# Patient Record
Sex: FEMALE | Race: WHITE | NOT HISPANIC OR LATINO | URBAN - METROPOLITAN AREA
[De-identification: names, ages, dates, MRNs, and addresses within clinical notes are randomized per-mention and may not be internally consistent; named-entity substitution may affect disease eponyms.]

---

## 2019-11-04 ENCOUNTER — OUTPATIENT (OUTPATIENT)
Dept: OUTPATIENT SERVICES | Facility: HOSPITAL | Age: 74
LOS: 1 days | End: 2019-11-04
Payer: MEDICARE

## 2019-11-04 DIAGNOSIS — R04.9 HEMORRHAGE FROM RESPIRATORY PASSAGES, UNSPECIFIED: ICD-10-CM

## 2019-11-04 PROCEDURE — 70498 CT ANGIOGRAPHY NECK: CPT

## 2019-11-04 PROCEDURE — 70498 CT ANGIOGRAPHY NECK: CPT | Mod: 26

## 2020-09-11 ENCOUNTER — EMERGENCY (EMERGENCY)
Facility: HOSPITAL | Age: 75
LOS: 1 days | Discharge: HOME | End: 2020-09-11
Attending: EMERGENCY MEDICINE
Payer: MEDICARE

## 2020-09-11 VITALS
RESPIRATION RATE: 18 BRPM | WEIGHT: 149.03 LBS | SYSTOLIC BLOOD PRESSURE: 151 MMHG | OXYGEN SATURATION: 95 % | HEART RATE: 100 BPM | DIASTOLIC BLOOD PRESSURE: 73 MMHG | HEIGHT: 68 IN | TEMPERATURE: 98 F

## 2020-09-11 VITALS
HEART RATE: 79 BPM | SYSTOLIC BLOOD PRESSURE: 153 MMHG | OXYGEN SATURATION: 98 % | DIASTOLIC BLOOD PRESSURE: 80 MMHG | RESPIRATION RATE: 15 BRPM

## 2020-09-11 LAB
ALBUMIN SERPL ELPH-MCNC: 4.5 G/DL — SIGNIFICANT CHANGE UP (ref 3.3–5)
ALP SERPL-CCNC: 111 U/L — SIGNIFICANT CHANGE UP (ref 40–120)
ALT FLD-CCNC: 15 U/L — SIGNIFICANT CHANGE UP (ref 10–45)
ANION GAP SERPL CALC-SCNC: 10 MMOL/L — SIGNIFICANT CHANGE UP (ref 5–17)
APPEARANCE UR: CLEAR — SIGNIFICANT CHANGE UP
AST SERPL-CCNC: 19 U/L — SIGNIFICANT CHANGE UP (ref 10–40)
BASOPHILS # BLD AUTO: 0.06 K/UL — SIGNIFICANT CHANGE UP (ref 0–0.2)
BASOPHILS NFR BLD AUTO: 0.7 % — SIGNIFICANT CHANGE UP (ref 0–2)
BILIRUB SERPL-MCNC: 0.2 MG/DL — SIGNIFICANT CHANGE UP (ref 0.2–1.2)
BILIRUB UR-MCNC: NEGATIVE — SIGNIFICANT CHANGE UP
BUN SERPL-MCNC: 19 MG/DL — SIGNIFICANT CHANGE UP (ref 7–23)
CALCIUM SERPL-MCNC: 10 MG/DL — SIGNIFICANT CHANGE UP (ref 8.4–10.5)
CHLORIDE SERPL-SCNC: 98 MMOL/L — SIGNIFICANT CHANGE UP (ref 96–108)
CO2 SERPL-SCNC: 25 MMOL/L — SIGNIFICANT CHANGE UP (ref 22–31)
COLOR SPEC: COLORLESS — SIGNIFICANT CHANGE UP
CREAT SERPL-MCNC: 0.82 MG/DL — SIGNIFICANT CHANGE UP (ref 0.5–1.3)
DIFF PNL FLD: NEGATIVE — SIGNIFICANT CHANGE UP
EOSINOPHIL # BLD AUTO: 0.1 K/UL — SIGNIFICANT CHANGE UP (ref 0–0.5)
EOSINOPHIL NFR BLD AUTO: 1.2 % — SIGNIFICANT CHANGE UP (ref 0–6)
GLUCOSE SERPL-MCNC: 100 MG/DL — HIGH (ref 70–99)
GLUCOSE UR QL: NEGATIVE — SIGNIFICANT CHANGE UP
HCT VFR BLD CALC: 43 % — SIGNIFICANT CHANGE UP (ref 34.5–45)
HGB BLD-MCNC: 14.2 G/DL — SIGNIFICANT CHANGE UP (ref 11.5–15.5)
HIV 1 & 2 AB SERPL IA.RAPID: SIGNIFICANT CHANGE UP
IMM GRANULOCYTES NFR BLD AUTO: 0.2 % — SIGNIFICANT CHANGE UP (ref 0–1.5)
KETONES UR-MCNC: NEGATIVE — SIGNIFICANT CHANGE UP
LEUKOCYTE ESTERASE UR-ACNC: NEGATIVE — SIGNIFICANT CHANGE UP
LYMPHOCYTES # BLD AUTO: 1.21 K/UL — SIGNIFICANT CHANGE UP (ref 1–3.3)
LYMPHOCYTES # BLD AUTO: 15 % — SIGNIFICANT CHANGE UP (ref 13–44)
MAGNESIUM SERPL-MCNC: 2 MG/DL — SIGNIFICANT CHANGE UP (ref 1.6–2.6)
MCHC RBC-ENTMCNC: 29.1 PG — SIGNIFICANT CHANGE UP (ref 27–34)
MCHC RBC-ENTMCNC: 33 GM/DL — SIGNIFICANT CHANGE UP (ref 32–36)
MCV RBC AUTO: 88.1 FL — SIGNIFICANT CHANGE UP (ref 80–100)
MONOCYTES # BLD AUTO: 0.69 K/UL — SIGNIFICANT CHANGE UP (ref 0–0.9)
MONOCYTES NFR BLD AUTO: 8.5 % — SIGNIFICANT CHANGE UP (ref 2–14)
NEUTROPHILS # BLD AUTO: 6.01 K/UL — SIGNIFICANT CHANGE UP (ref 1.8–7.4)
NEUTROPHILS NFR BLD AUTO: 74.4 % — SIGNIFICANT CHANGE UP (ref 43–77)
NITRITE UR-MCNC: NEGATIVE — SIGNIFICANT CHANGE UP
NRBC # BLD: 0 /100 WBCS — SIGNIFICANT CHANGE UP (ref 0–0)
PH UR: 6.5 — SIGNIFICANT CHANGE UP (ref 5–8)
PHOSPHATE SERPL-MCNC: 3 MG/DL — SIGNIFICANT CHANGE UP (ref 2.5–4.5)
PLATELET # BLD AUTO: 240 K/UL — SIGNIFICANT CHANGE UP (ref 150–400)
POTASSIUM SERPL-MCNC: 4.6 MMOL/L — SIGNIFICANT CHANGE UP (ref 3.5–5.3)
POTASSIUM SERPL-SCNC: 4.6 MMOL/L — SIGNIFICANT CHANGE UP (ref 3.5–5.3)
PROT SERPL-MCNC: 7.1 G/DL — SIGNIFICANT CHANGE UP (ref 6–8.3)
PROT UR-MCNC: NEGATIVE — SIGNIFICANT CHANGE UP
RBC # BLD: 4.88 M/UL — SIGNIFICANT CHANGE UP (ref 3.8–5.2)
RBC # FLD: 13 % — SIGNIFICANT CHANGE UP (ref 10.3–14.5)
SODIUM SERPL-SCNC: 133 MMOL/L — LOW (ref 135–145)
SP GR SPEC: 1.01 — LOW (ref 1.01–1.02)
TROPONIN T, HIGH SENSITIVITY RESULT: 7 NG/L — SIGNIFICANT CHANGE UP (ref 0–51)
TROPONIN T, HIGH SENSITIVITY RESULT: <6 NG/L — SIGNIFICANT CHANGE UP (ref 0–51)
TSH SERPL-MCNC: 2.17 UIU/ML — SIGNIFICANT CHANGE UP (ref 0.27–4.2)
UROBILINOGEN FLD QL: NEGATIVE — SIGNIFICANT CHANGE UP
WBC # BLD: 8.09 K/UL — SIGNIFICANT CHANGE UP (ref 3.8–10.5)
WBC # FLD AUTO: 8.09 K/UL — SIGNIFICANT CHANGE UP (ref 3.8–10.5)

## 2020-09-11 PROCEDURE — 84100 ASSAY OF PHOSPHORUS: CPT

## 2020-09-11 PROCEDURE — 83735 ASSAY OF MAGNESIUM: CPT

## 2020-09-11 PROCEDURE — 71046 X-RAY EXAM CHEST 2 VIEWS: CPT

## 2020-09-11 PROCEDURE — 86703 HIV-1/HIV-2 1 RESULT ANTBDY: CPT

## 2020-09-11 PROCEDURE — 99285 EMERGENCY DEPT VISIT HI MDM: CPT

## 2020-09-11 PROCEDURE — 80053 COMPREHEN METABOLIC PANEL: CPT

## 2020-09-11 PROCEDURE — 84484 ASSAY OF TROPONIN QUANT: CPT

## 2020-09-11 PROCEDURE — 84443 ASSAY THYROID STIM HORMONE: CPT

## 2020-09-11 PROCEDURE — 81003 URINALYSIS AUTO W/O SCOPE: CPT

## 2020-09-11 PROCEDURE — 93010 ELECTROCARDIOGRAM REPORT: CPT

## 2020-09-11 PROCEDURE — 99284 EMERGENCY DEPT VISIT MOD MDM: CPT | Mod: 25

## 2020-09-11 PROCEDURE — 93005 ELECTROCARDIOGRAM TRACING: CPT

## 2020-09-11 PROCEDURE — 85025 COMPLETE CBC W/AUTO DIFF WBC: CPT

## 2020-09-11 PROCEDURE — 71046 X-RAY EXAM CHEST 2 VIEWS: CPT | Mod: 26

## 2020-09-11 NOTE — ED PROVIDER NOTE - PHYSICAL EXAMINATION
Gen.  no acute distress, well appearing female.   HEENT:  perrl eomi. no thyroid masses, no thyromegaly. no ttp.   Lungs:  b/l bs  CVS: S1S2 reg HR 98  Abd;  soft non tender no distention  Ext: no pitting edema or erythema  Neuro: aaox3   MSK: no pitting edema or erythema

## 2020-09-11 NOTE — ED PROVIDER NOTE - PATIENT PORTAL LINK FT
You can access the FollowMyHealth Patient Portal offered by [facility] by registering at the following website: [email or web address]. By joining [employer]’s FollowMyHealth portal, you will also be able to view your health information using other applications (apps) compatible with our system.

## 2020-09-11 NOTE — ED ADULT NURSE NOTE - OBJECTIVE STATEMENT
74 y/o female presents to ed c/o heart racing. States she has felt like this intermittently for the past 2 months and had been evaluated by her PCP. Today she was relaxing when she began to feel her heart racing a/w SOB, dizziness and nausea. Took her blood pressure and it was elevated. Also states that she has noted some thinning to her hair. Denies ha, v/d, abdominal pain, f/c, urinary symptoms, hematuria. A&Ox4, hypertensive, skin warm dry and intact, MAEx4, lungs CTA, abd soft nondistended. Pt appears anxious, she is concerned with her health and wants to  know what it going on. Placed on CM, ekg is NSR with left atrial enlargement. Patient's bed in the lowest position, explained plan of care to patient and family members. Will continue to reassess.

## 2020-09-11 NOTE — ED PROVIDER NOTE - NSPTACCESSSVCSAPPTDETAILS_ED_ALL_ED_FT
Has outpt scheduled for Sept 21 thru pmd but we would like her to follow up sooner and she prefer with [facility]. Please call her for appt. for cardiology

## 2020-09-11 NOTE — ED ADULT NURSE NOTE - ED STAT RN HANDOFF DETAILS
Bedside report given to on coming nurse Carola. Understands pmh, medications given and plan of care for patient. Patient in stable condition, vital signs updated, has no complaints at this time and has been updated on care plan. Explained to patient that it is change of shift and new nurse is taking over, pt verbalized understanding.

## 2020-09-11 NOTE — ED PROVIDER NOTE - NSFOLLOWUPINSTRUCTIONS_ED_ALL_ED_FT
Knlunphvu-it-o-Glance  *More detailed information regarding your visit and discharge can be found by reviewing this packet.*  -----------------------------    Your diagnosis this visit was: Heart Palpitations.     No new medications were prescribed during this visit. Continue taking all your regular medications    You may be contacted by our Emergency Department Referrals Coordinator to set up your follow-up appointment within 24-48 hours of your discharge, Monday through Friday. We recommend you follow up with:  Your Doctor - Dr. Mack   Your Cardiologist on the scheduled upcoming appointment Sept 21. Please call them after leaving today and advise of your Emergency Department Visit.     Please return to the Emergency Department if you experience any of the following symptoms:  - Shortness of breath or trouble breathing  - Pressure, pain, or tightness in the chest  - Face drooping, arm weakness or speech difficulty,  - Persistent or severe vomiting  - Head injury or loss of consciousness  - Nonstop bleeding or an open wound

## 2020-09-11 NOTE — ED PROVIDER NOTE - OBJECTIVE STATEMENT
74 y/o f with pm HLD presents for concern of heart palp. she felt this am like heart was racing. checked her bp and noted it was high as well. no fever no chills no weight changes. no abd pain. had recent cardiac work up which included an echo, ekg and carotid duplex, + nausea no abd pain. no urinary complaints. no dizziness but at times has felt like she was passing out. has a stress test scheduled for Sept 21. recently started on B12, Biotin.   PMD Dr. Mack

## 2020-09-11 NOTE — ED PROVIDER NOTE - CLINICAL SUMMARY MEDICAL DECISION MAKING FREE TEXT BOX
ATTG: : heart palp. concern for arrhythmia, check labs, tsh, cardiac work up, and re eval for dispo.

## 2020-09-11 NOTE — ED PROVIDER NOTE - PROGRESS NOTE DETAILS
ATTG: : I spoke with Dr. Mack and she reviewed pts chart. had an echo in aug and ekg that was normal. she has an appointment scheduled on Sept 21 with cardio. both patient and md will called to attempt to expedite  her appt. labs as noted in chart. given no chest pain / tachycardia / recurrence of symptoms will dc with close follow up and return precautions provided.

## 2020-09-11 NOTE — ED PROVIDER NOTE - CARDIOVASCULAR NEGATIVE STATEMENT, MLM
[Dear  ___] : Dear  [unfilled], [I had the pleasure of evaluating your patient, [unfilled].] : I had the pleasure of evaluating your patient, [unfilled]. [FreeTextEntry2] : Padilla Warner [FreeTextEntry1] : Thank you for this referral. I have enclosed my note for your review. Please feel free to contact my office if you have additional questions regarding this patient.\par \par Regards,\par Isra Klein MD, FACS, FAAOS\par \par  of Orthopaedic Surgery\par [facility] [employer]\par Spinal Reconstruction Surgery\par Minimally Invasive Spinal Surgery\par [facility] no chest pain and no edema. + palp.

## 2021-04-14 PROBLEM — E78.5 HYPERLIPIDEMIA, UNSPECIFIED: Chronic | Status: ACTIVE | Noted: 2020-09-11

## 2021-06-07 ENCOUNTER — APPOINTMENT (OUTPATIENT)
Dept: ULTRASOUND IMAGING | Facility: CLINIC | Age: 76
End: 2021-06-07
Payer: MEDICARE

## 2021-06-07 PROCEDURE — 76642 ULTRASOUND BREAST LIMITED: CPT | Mod: RT

## 2021-11-23 PROBLEM — Z00.00 ENCOUNTER FOR PREVENTIVE HEALTH EXAMINATION: Status: ACTIVE | Noted: 2021-11-23

## 2021-12-03 ENCOUNTER — APPOINTMENT (OUTPATIENT)
Dept: MAMMOGRAPHY | Facility: CLINIC | Age: 76
End: 2021-12-03
Payer: MEDICARE

## 2021-12-03 ENCOUNTER — APPOINTMENT (OUTPATIENT)
Dept: ULTRASOUND IMAGING | Facility: CLINIC | Age: 76
End: 2021-12-03
Payer: MEDICARE

## 2021-12-03 PROCEDURE — 77063 BREAST TOMOSYNTHESIS BI: CPT

## 2021-12-03 PROCEDURE — 76641 ULTRASOUND BREAST COMPLETE: CPT | Mod: 50

## 2021-12-03 PROCEDURE — 77067 SCR MAMMO BI INCL CAD: CPT

## 2022-07-09 ENCOUNTER — TRANSCRIPTION ENCOUNTER (OUTPATIENT)
Age: 77
End: 2022-07-09

## 2022-07-09 ENCOUNTER — INPATIENT (INPATIENT)
Facility: HOSPITAL | Age: 77
LOS: 3 days | Discharge: HOME | DRG: 312 | End: 2022-07-13
Attending: INTERNAL MEDICINE | Admitting: INTERNAL MEDICINE
Payer: COMMERCIAL

## 2022-07-09 VITALS — HEIGHT: 68 IN | WEIGHT: 139.99 LBS

## 2022-07-09 DIAGNOSIS — S22.49XA MULTIPLE FRACTURES OF RIBS, UNSPECIFIED SIDE, INITIAL ENCOUNTER FOR CLOSED FRACTURE: ICD-10-CM

## 2022-07-09 LAB
ALBUMIN SERPL ELPH-MCNC: 4.2 G/DL — SIGNIFICANT CHANGE UP (ref 3.3–5)
ALP SERPL-CCNC: 96 U/L — SIGNIFICANT CHANGE UP (ref 40–120)
ALT FLD-CCNC: 26 U/L — SIGNIFICANT CHANGE UP (ref 10–45)
ANION GAP SERPL CALC-SCNC: 13 MMOL/L — SIGNIFICANT CHANGE UP (ref 5–17)
APTT BLD: 25.5 SEC — LOW (ref 27.5–35.5)
AST SERPL-CCNC: 30 U/L — SIGNIFICANT CHANGE UP (ref 10–40)
BASOPHILS # BLD AUTO: 0.07 K/UL — SIGNIFICANT CHANGE UP (ref 0–0.2)
BASOPHILS NFR BLD AUTO: 0.6 % — SIGNIFICANT CHANGE UP (ref 0–2)
BILIRUB SERPL-MCNC: 0.3 MG/DL — SIGNIFICANT CHANGE UP (ref 0.2–1.2)
BUN SERPL-MCNC: 26 MG/DL — HIGH (ref 7–23)
CALCIUM SERPL-MCNC: 10.1 MG/DL — SIGNIFICANT CHANGE UP (ref 8.4–10.5)
CHLORIDE SERPL-SCNC: 100 MMOL/L — SIGNIFICANT CHANGE UP (ref 96–108)
CO2 SERPL-SCNC: 24 MMOL/L — SIGNIFICANT CHANGE UP (ref 22–31)
CREAT SERPL-MCNC: 0.99 MG/DL — SIGNIFICANT CHANGE UP (ref 0.5–1.3)
EGFR: 59 ML/MIN/1.73M2 — LOW
EOSINOPHIL # BLD AUTO: 0.12 K/UL — SIGNIFICANT CHANGE UP (ref 0–0.5)
EOSINOPHIL NFR BLD AUTO: 1.1 % — SIGNIFICANT CHANGE UP (ref 0–6)
ETHANOL SERPL-MCNC: <10 MG/DL — SIGNIFICANT CHANGE UP (ref 0–10)
FLUAV AG NPH QL: SIGNIFICANT CHANGE UP
FLUBV AG NPH QL: SIGNIFICANT CHANGE UP
GLUCOSE SERPL-MCNC: 138 MG/DL — HIGH (ref 70–99)
HCT VFR BLD CALC: 41.2 % — SIGNIFICANT CHANGE UP (ref 34.5–45)
HGB BLD-MCNC: 13.6 G/DL — SIGNIFICANT CHANGE UP (ref 11.5–15.5)
IMM GRANULOCYTES NFR BLD AUTO: 1.2 % — SIGNIFICANT CHANGE UP (ref 0–1.5)
INR BLD: 1 RATIO — SIGNIFICANT CHANGE UP (ref 0.88–1.16)
LACTATE SERPL-SCNC: 1.9 MMOL/L — SIGNIFICANT CHANGE UP (ref 0.7–2)
LIDOCAIN IGE QN: 36 U/L — SIGNIFICANT CHANGE UP (ref 7–60)
LYMPHOCYTES # BLD AUTO: 3.98 K/UL — HIGH (ref 1–3.3)
LYMPHOCYTES # BLD AUTO: 36.4 % — SIGNIFICANT CHANGE UP (ref 13–44)
MCHC RBC-ENTMCNC: 28.6 PG — SIGNIFICANT CHANGE UP (ref 27–34)
MCHC RBC-ENTMCNC: 33 GM/DL — SIGNIFICANT CHANGE UP (ref 32–36)
MCV RBC AUTO: 86.6 FL — SIGNIFICANT CHANGE UP (ref 80–100)
MONOCYTES # BLD AUTO: 0.94 K/UL — HIGH (ref 0–0.9)
MONOCYTES NFR BLD AUTO: 8.6 % — SIGNIFICANT CHANGE UP (ref 2–14)
NEUTROPHILS # BLD AUTO: 5.7 K/UL — SIGNIFICANT CHANGE UP (ref 1.8–7.4)
NEUTROPHILS NFR BLD AUTO: 52.1 % — SIGNIFICANT CHANGE UP (ref 43–77)
NRBC # BLD: 0 /100 WBCS — SIGNIFICANT CHANGE UP (ref 0–0)
PLATELET # BLD AUTO: 260 K/UL — SIGNIFICANT CHANGE UP (ref 150–400)
POTASSIUM SERPL-MCNC: 4 MMOL/L — SIGNIFICANT CHANGE UP (ref 3.5–5.3)
POTASSIUM SERPL-SCNC: 4 MMOL/L — SIGNIFICANT CHANGE UP (ref 3.5–5.3)
PROT SERPL-MCNC: 6.7 G/DL — SIGNIFICANT CHANGE UP (ref 6–8.3)
PROTHROM AB SERPL-ACNC: 11.6 SEC — SIGNIFICANT CHANGE UP (ref 10.5–13.4)
RBC # BLD: 4.76 M/UL — SIGNIFICANT CHANGE UP (ref 3.8–5.2)
RBC # FLD: 13.4 % — SIGNIFICANT CHANGE UP (ref 10.3–14.5)
RSV RNA NPH QL NAA+NON-PROBE: SIGNIFICANT CHANGE UP
SARS-COV-2 RNA SPEC QL NAA+PROBE: SIGNIFICANT CHANGE UP
SODIUM SERPL-SCNC: 137 MMOL/L — SIGNIFICANT CHANGE UP (ref 135–145)
TROPONIN T, HIGH SENSITIVITY RESULT: 12 NG/L — SIGNIFICANT CHANGE UP (ref 0–51)
TROPONIN T, HIGH SENSITIVITY RESULT: 15 NG/L — SIGNIFICANT CHANGE UP (ref 0–51)
WBC # BLD: 10.94 K/UL — HIGH (ref 3.8–10.5)
WBC # FLD AUTO: 10.94 K/UL — HIGH (ref 3.8–10.5)

## 2022-07-09 PROCEDURE — 73110 X-RAY EXAM OF WRIST: CPT | Mod: 26,RT,77

## 2022-07-09 PROCEDURE — 72125 CT NECK SPINE W/O DYE: CPT | Mod: 26,MA

## 2022-07-09 PROCEDURE — 93010 ELECTROCARDIOGRAM REPORT: CPT

## 2022-07-09 PROCEDURE — 73090 X-RAY EXAM OF FOREARM: CPT | Mod: 26,LT

## 2022-07-09 PROCEDURE — 71260 CT THORAX DX C+: CPT | Mod: 26,MA

## 2022-07-09 PROCEDURE — 99285 EMERGENCY DEPT VISIT HI MDM: CPT

## 2022-07-09 PROCEDURE — 71045 X-RAY EXAM CHEST 1 VIEW: CPT | Mod: 26

## 2022-07-09 PROCEDURE — 70496 CT ANGIOGRAPHY HEAD: CPT | Mod: 26,MA

## 2022-07-09 PROCEDURE — 70450 CT HEAD/BRAIN W/O DYE: CPT | Mod: 26,59,MA

## 2022-07-09 PROCEDURE — 73110 X-RAY EXAM OF WRIST: CPT | Mod: 26,LT

## 2022-07-09 PROCEDURE — 70498 CT ANGIOGRAPHY NECK: CPT | Mod: 26,MA

## 2022-07-09 PROCEDURE — 73562 X-RAY EXAM OF KNEE 3: CPT | Mod: 26,LT

## 2022-07-09 PROCEDURE — 74177 CT ABD & PELVIS W/CONTRAST: CPT | Mod: 26,MA

## 2022-07-09 RX ORDER — ONDANSETRON 8 MG/1
4 TABLET, FILM COATED ORAL ONCE
Refills: 0 | Status: COMPLETED | OUTPATIENT
Start: 2022-07-09 | End: 2022-07-09

## 2022-07-09 RX ORDER — ACETAMINOPHEN 500 MG
650 TABLET ORAL EVERY 6 HOURS
Refills: 0 | Status: DISCONTINUED | OUTPATIENT
Start: 2022-07-09 | End: 2022-07-13

## 2022-07-09 RX ORDER — LIDOCAINE 4 G/100G
1 CREAM TOPICAL ONCE
Refills: 0 | Status: COMPLETED | OUTPATIENT
Start: 2022-07-09 | End: 2022-07-09

## 2022-07-09 RX ORDER — SODIUM CHLORIDE 9 MG/ML
250 INJECTION INTRAMUSCULAR; INTRAVENOUS; SUBCUTANEOUS ONCE
Refills: 0 | Status: COMPLETED | OUTPATIENT
Start: 2022-07-09 | End: 2022-07-09

## 2022-07-09 RX ORDER — ACETAMINOPHEN 500 MG
975 TABLET ORAL ONCE
Refills: 0 | Status: COMPLETED | OUTPATIENT
Start: 2022-07-09 | End: 2022-07-09

## 2022-07-09 RX ORDER — SODIUM CHLORIDE 9 MG/ML
750 INJECTION INTRAMUSCULAR; INTRAVENOUS; SUBCUTANEOUS ONCE
Refills: 0 | Status: COMPLETED | OUTPATIENT
Start: 2022-07-09 | End: 2022-07-09

## 2022-07-09 RX ORDER — ATORVASTATIN CALCIUM 80 MG/1
20 TABLET, FILM COATED ORAL AT BEDTIME
Refills: 0 | Status: DISCONTINUED | OUTPATIENT
Start: 2022-07-09 | End: 2022-07-13

## 2022-07-09 RX ORDER — FLUVASTATIN SODIUM 80 MG/1
1 TABLET, FILM COATED, EXTENDED RELEASE ORAL
Qty: 0 | Refills: 0 | DISCHARGE

## 2022-07-09 RX ADMIN — LIDOCAINE 1 PATCH: 4 CREAM TOPICAL at 19:32

## 2022-07-09 RX ADMIN — Medication 975 MILLIGRAM(S): at 21:14

## 2022-07-09 RX ADMIN — ATORVASTATIN CALCIUM 20 MILLIGRAM(S): 80 TABLET, FILM COATED ORAL at 21:15

## 2022-07-09 RX ADMIN — LIDOCAINE 1 PATCH: 4 CREAM TOPICAL at 14:53

## 2022-07-09 RX ADMIN — Medication 975 MILLIGRAM(S): at 22:00

## 2022-07-09 RX ADMIN — Medication 975 MILLIGRAM(S): at 11:58

## 2022-07-09 RX ADMIN — SODIUM CHLORIDE 250 MILLILITER(S): 9 INJECTION INTRAMUSCULAR; INTRAVENOUS; SUBCUTANEOUS at 11:58

## 2022-07-09 RX ADMIN — SODIUM CHLORIDE 750 MILLILITER(S): 9 INJECTION INTRAMUSCULAR; INTRAVENOUS; SUBCUTANEOUS at 14:54

## 2022-07-09 RX ADMIN — Medication 975 MILLIGRAM(S): at 15:02

## 2022-07-09 NOTE — DISCHARGE NOTE PROVIDER - PROVIDER TOKENS
PROVIDER:[TOKEN:[[id number]],FOLLOWUP:[2 weeks]] PROVIDER:[TOKEN:[[id number]],FOLLOWUP:[2 weeks]],PROVIDER:[TOKEN:[[id number]],FOLLOWUP:[1 week]],PROVIDER:[TOKEN:[[id number]],FOLLOWUP:[1 week]]
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
## 2022-07-09 NOTE — ED PROVIDER NOTE - ATTENDING CONTRIBUTION TO CARE
Attending MD Dayan Summers:  I personally have seen and examined this patient.  Resident note reviewed and agree on plan of care and except where noted.  See HPI, PE, and MDM for details.

## 2022-07-09 NOTE — DISCHARGE NOTE PROVIDER - CARE PROVIDER_API CALL
Kurt Lowe (MD)  Orthopaedic Surgery  [address] [place] [address], [address]  [place] [address]  Phone: [phone]  Fax: [phone]  Follow Up Time: 2 weeks   Kurt Lowe (MD)  Orthopaedic Surgery  [address] [facility], [address]  [place] [address]  Phone: [phone]  Fax: [phone]  Follow Up Time: 2 weeks    Antonio Adame ()  Cardiology; Internal Medicine  [address] [facility], [address]  [place] [address]  Phone: [phone]  Fax: [phone]  Follow Up Time: 1 week    Roberto Mack  INTERNAL MEDICINE  [address] [place] [address]  Phone: [phone]  Fax: [phone]  Follow Up Time: 1 week
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
## 2022-07-09 NOTE — DISCHARGE NOTE PROVIDER - NSDCCPCAREPLAN_GEN_ALL_CORE_FT
PRINCIPAL DISCHARGE DIAGNOSIS  Diagnosis: Fracture of ulnar styloid  Assessment and Plan of Treatment: Ortho was consulted in setting of bilateral ulnar styloid fractures recommending bilateral wrist cock-up splints for comfort, weight bearing as tolerated to bilateral upper extremities. No acute orthopaedic surgical intervention indicated at this time   Follow up with Dr. Lowe for further evaluation and management within 1 week.      SECONDARY DISCHARGE DIAGNOSES  Diagnosis: Avulsion injury  Assessment and Plan of Treatment: Imaging of the left knee reviewed also showing likely posterior cruciate ligament avulsion injury of the knee. Ortho team recommended dianelys brace for the left knee to be worn while out of bed, unlocked; Weight bearing as tolerated to the left lower extremity.   Please follow up with Dr. Lowe for further evaluation and management within 1 week.       Diagnosis: Rib fractures  Assessment and Plan of Treatment: Follow-up with your primary care physician and Dr. Lowe for further evaluation and management within 1 week. Call for appointment.  Please bring all discharge paperwork and list of medications to all follow up appointments  Please call for follow up appointments one day after discharge      Diagnosis: Syncope  Assessment and Plan of Treatment: Yash was admitted to telemetry for further medical management.  Cardiology was consulted in setting of syncope. You were noted to have symptomatic orthostatic hypotension. Yoi were started on midodrine with marked improvement in orthostatics. Transthoracic echocardiogram unremarkable.
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
## 2022-07-09 NOTE — DISCHARGE NOTE PROVIDER - NPI NUMBER (FOR SYSADMIN USE ONLY) :
Has The Growth Been Previously Biopsied?: has been previously biopsied Body Location Override (Optional): Right inferior neck [[id number]] [[id number]],[[id number]],[[id number]]

## 2022-07-09 NOTE — DISCHARGE NOTE PROVIDER - NSDCMRMEDTOKEN_GEN_ALL_CORE_FT
aspirin 325 mg oral delayed release tablet: 1 tab(s) orally once a day (at bedtime)  biotin: 24985 microgram(s) orally once a day  fluvastatin 80 mg oral tablet, extended release: 1 tab(s) orally once a day (at bedtime)  hydrOXYzine hydrochloride 10 mg oral tablet: 1 tab(s) orally , As Needed  turmeric:   Vitamin B12 500 mcg oral tablet: 1 tab(s) orally 3 times a week  Vitamin D3:    aspirin 325 mg oral delayed release tablet: 1 tab(s) orally once a day (at bedtime)  biotin: 46602 microgram(s) orally once a day  [place]ace: ICD 10 R26.2  fluvastatin 80 mg oral tablet, extended release: 1 tab(s) orally once a day (at bedtime)  hydrOXYzine hydrochloride 10 mg oral tablet: 1 tab(s) orally , As Needed  [place]: ICD 10: R53.1  turmeric:   Vitamin B12 500 mcg oral tablet: 1 tab(s) orally 3 times a week  Vitamin D3:    fluvastatin 80 mg oral tablet, extended release: 1 tab(s) orally once a day (at bedtime)  midodrine 5 mg oral tablet: 1 tab(s) orally 2 times a day  6 am and 6 pm
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
## 2022-07-09 NOTE — ED PROVIDER NOTE - PHYSICAL EXAMINATION
General: appears uncomfortable, AOx3  Skin: no rash, no pallor, diaphoretic   Head: normocephalic, atraumatic  Eyes: clear conjunctiva, EOMI, PERRL   ENMT: airway patent, no nasal discharge, no laceration visible in oropharynx   Cardiovascular: normal rate, normal rhythm, S1/S2  Pulmonary: clear to auscultation bilaterally, no rales, rhonchi, or wheeze  Abdomen: soft, nontender, no guarding  Musculoskeletal: moving extremities well, no deformity, no midline c/t/l spine tenderness, L dorsal wrist ecchymosis, nontender, normal ROM of L digits/wrist/elbow, Abrasion to L clavicle   Psych: normal mood, normal affect General: appears uncomfortable, AOx3  Skin: no rash, no pallor, diaphoretic   Head: normocephalic, atraumatic  Eyes: clear conjunctiva, EOMI, PERRL   ENMT: airway patent, no nasal discharge, no laceration visible in oropharynx   Cardiovascular: normal rate, normal rhythm, S1/S2  Pulmonary: clear to auscultation bilaterally, no rales, rhonchi, or wheeze  Abdomen: soft, nontender, no guarding  Musculoskeletal: moving extremities well, no deformity, no midline c/t/l spine tenderness, L dorsal wrist ecchymosis, nontender, normal ROM of L digits/wrist/elbow, Abrasion to L clavicle   Psych: normal mood, normal affect  Attending Dayan Summers: Gen: NAD, heent: atrauamtic, eomi, perrla, mmm, op pink,, neck; nttp, no nuchal rigidity, chest: nttp, no crepitus, cv: rrr, no murmurs, lungs: ctab, abd: soft, ttp LLQ, no peritoneal signs, , no guarding, ext: wwp,left wrist with ecchymoes, no snuff box ttp, cap refill intact, distal pulses intact. , skin: no rash, neuro: awake and alert, following commands, speech clear, sensation and strength intact, no focal deficits

## 2022-07-09 NOTE — ED PROVIDER NOTE - CARE PLAN
1 Principal Discharge DX:	Rib fractures  Secondary Diagnosis:	Fracture of ulnar styloid  Secondary Diagnosis:	Syncope
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
## 2022-07-09 NOTE — PATIENT PROFILE ADULT - FUNCTIONAL ASSESSMENT - BASIC MOBILITY 6.
2-calculated by average/Not able to assess (calculate score using [facility] averaging method)
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
## 2022-07-09 NOTE — DISCHARGE NOTE PROVIDER - NSDCFUADDAPPT_GEN_ALL_CORE_FT
APPTS ARE READY TO BE MADE: [X] YES    Best Family or Patient Contact (if needed):    Additional Information about above appointments (if needed):    1:   2:   3:     Other comments or requests:    APPTS ARE READY TO BE MADE: [X] YES    Best Family or Patient Contact (if needed):    Additional Information about above appointments (if needed):    1:   2:   3:     Other comments or requests:    Patient was provided with follow up request details and was advised to call to schedule follow up within specified time frame.
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
## 2022-07-09 NOTE — ED ADULT NURSE NOTE - CAS TRG GEN SKIN COLOR
Normal for race Protopic Pregnancy And Lactation Text: This medication is Pregnancy Category C. It is unknown if this medication is excreted in breast milk when applied topically.

## 2022-07-09 NOTE — PATIENT PROFILE ADULT - FALL HARM RISK - HARM RISK INTERVENTIONS

## 2022-07-10 DIAGNOSIS — R55 SYNCOPE AND COLLAPSE: ICD-10-CM

## 2022-07-10 DIAGNOSIS — S52.90XA UNSPECIFIED FRACTURE OF UNSPECIFIED FOREARM, INITIAL ENCOUNTER FOR CLOSED FRACTURE: ICD-10-CM

## 2022-07-10 LAB
ANION GAP SERPL CALC-SCNC: 12 MMOL/L — SIGNIFICANT CHANGE UP (ref 5–17)
APPEARANCE UR: ABNORMAL
BACTERIA # UR AUTO: ABNORMAL
BILIRUB UR-MCNC: NEGATIVE — SIGNIFICANT CHANGE UP
BUN SERPL-MCNC: 15 MG/DL — SIGNIFICANT CHANGE UP (ref 7–23)
CALCIUM SERPL-MCNC: 9.7 MG/DL — SIGNIFICANT CHANGE UP (ref 8.4–10.5)
CHLORIDE SERPL-SCNC: 99 MMOL/L — SIGNIFICANT CHANGE UP (ref 96–108)
CO2 SERPL-SCNC: 23 MMOL/L — SIGNIFICANT CHANGE UP (ref 22–31)
COLOR SPEC: SIGNIFICANT CHANGE UP
CREAT SERPL-MCNC: 0.8 MG/DL — SIGNIFICANT CHANGE UP (ref 0.5–1.3)
DIFF PNL FLD: ABNORMAL
EGFR: 76 ML/MIN/1.73M2 — SIGNIFICANT CHANGE UP
EPI CELLS # UR: 1 /HPF — SIGNIFICANT CHANGE UP
FOLATE SERPL-MCNC: 13 NG/ML — SIGNIFICANT CHANGE UP
GLUCOSE SERPL-MCNC: 94 MG/DL — SIGNIFICANT CHANGE UP (ref 70–99)
GLUCOSE UR QL: NEGATIVE — SIGNIFICANT CHANGE UP
HCT VFR BLD CALC: 37.5 % — SIGNIFICANT CHANGE UP (ref 34.5–45)
HCV AB S/CO SERPL IA: 0.07 S/CO — SIGNIFICANT CHANGE UP (ref 0–0.99)
HCV AB SERPL-IMP: SIGNIFICANT CHANGE UP
HGB BLD-MCNC: 12.7 G/DL — SIGNIFICANT CHANGE UP (ref 11.5–15.5)
KETONES UR-MCNC: NEGATIVE — SIGNIFICANT CHANGE UP
LEUKOCYTE ESTERASE UR-ACNC: NEGATIVE — SIGNIFICANT CHANGE UP
MAGNESIUM SERPL-MCNC: 1.8 MG/DL — SIGNIFICANT CHANGE UP (ref 1.6–2.6)
MCHC RBC-ENTMCNC: 28.5 PG — SIGNIFICANT CHANGE UP (ref 27–34)
MCHC RBC-ENTMCNC: 33.9 GM/DL — SIGNIFICANT CHANGE UP (ref 32–36)
MCV RBC AUTO: 84.1 FL — SIGNIFICANT CHANGE UP (ref 80–100)
NITRITE UR-MCNC: NEGATIVE — SIGNIFICANT CHANGE UP
NRBC # BLD: 0 /100 WBCS — SIGNIFICANT CHANGE UP (ref 0–0)
PH UR: 7 — SIGNIFICANT CHANGE UP (ref 5–8)
PHOSPHATE SERPL-MCNC: 3 MG/DL — SIGNIFICANT CHANGE UP (ref 2.5–4.5)
PLATELET # BLD AUTO: 193 K/UL — SIGNIFICANT CHANGE UP (ref 150–400)
POTASSIUM SERPL-MCNC: 4.2 MMOL/L — SIGNIFICANT CHANGE UP (ref 3.5–5.3)
POTASSIUM SERPL-SCNC: 4.2 MMOL/L — SIGNIFICANT CHANGE UP (ref 3.5–5.3)
PROT UR-MCNC: NEGATIVE — SIGNIFICANT CHANGE UP
RBC # BLD: 4.46 M/UL — SIGNIFICANT CHANGE UP (ref 3.8–5.2)
RBC # FLD: 13.3 % — SIGNIFICANT CHANGE UP (ref 10.3–14.5)
RBC CASTS # UR COMP ASSIST: 4 /HPF — SIGNIFICANT CHANGE UP (ref 0–4)
SODIUM SERPL-SCNC: 134 MMOL/L — LOW (ref 135–145)
SP GR SPEC: 1.01 — SIGNIFICANT CHANGE UP (ref 1.01–1.02)
TSH SERPL-MCNC: 1.05 UIU/ML — SIGNIFICANT CHANGE UP (ref 0.27–4.2)
UROBILINOGEN FLD QL: NEGATIVE — SIGNIFICANT CHANGE UP
VIT B12 SERPL-MCNC: 1540 PG/ML — HIGH (ref 232–1245)
WBC # BLD: 6.93 K/UL — SIGNIFICANT CHANGE UP (ref 3.8–10.5)
WBC # FLD AUTO: 6.93 K/UL — SIGNIFICANT CHANGE UP (ref 3.8–10.5)
WBC UR QL: 1 /HPF — SIGNIFICANT CHANGE UP (ref 0–5)

## 2022-07-10 PROCEDURE — 76377 3D RENDER W/INTRP POSTPROCES: CPT | Mod: 26

## 2022-07-10 PROCEDURE — 73700 CT LOWER EXTREMITY W/O DYE: CPT | Mod: 26,LT

## 2022-07-10 RX ADMIN — LIDOCAINE 1 PATCH: 4 CREAM TOPICAL at 03:50

## 2022-07-10 RX ADMIN — ATORVASTATIN CALCIUM 20 MILLIGRAM(S): 80 TABLET, FILM COATED ORAL at 21:05

## 2022-07-10 RX ADMIN — Medication 650 MILLIGRAM(S): at 11:00

## 2022-07-10 RX ADMIN — Medication 650 MILLIGRAM(S): at 09:24

## 2022-07-10 RX ADMIN — Medication 650 MILLIGRAM(S): at 17:14

## 2022-07-10 RX ADMIN — Medication 650 MILLIGRAM(S): at 15:52
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
## 2022-07-10 NOTE — CHART NOTE - NSCHARTNOTEFT_GEN_A_CORE
Case and imaging of the left knee reviewed with Dr. Lowe. Given likely PCL avulsion injury of the knee and bilateral ulnar styloid fractures, recommend:    - DVT prophylaxis per primary team  - [place] brace for the left knee to be worn while out of bed, unlocked; WBAT LLE  - Bilateral wrist cock-up splints for comfort, WBAT bilateral upper extremities  - No acute orthopaedic surgical intervention indicated at this time. This patient is orthopaedically stable for discharge.   - Patient to follow up with Dr. Lowe as an outpatient for further evaluation and management.

## 2022-07-11 PROCEDURE — 93306 TTE W/DOPPLER COMPLETE: CPT | Mod: 26

## 2022-07-11 RX ORDER — HEPARIN SODIUM 5000 [USP'U]/ML
5000 INJECTION INTRAVENOUS; SUBCUTANEOUS EVERY 12 HOURS
Refills: 0 | Status: DISCONTINUED | OUTPATIENT
Start: 2022-07-11 | End: 2022-07-13

## 2022-07-11 RX ORDER — SODIUM CHLORIDE 9 MG/ML
1000 INJECTION INTRAMUSCULAR; INTRAVENOUS; SUBCUTANEOUS
Refills: 0 | Status: DISCONTINUED | OUTPATIENT
Start: 2022-07-11 | End: 2022-07-12

## 2022-07-11 RX ADMIN — Medication 650 MILLIGRAM(S): at 16:11

## 2022-07-11 RX ADMIN — Medication 650 MILLIGRAM(S): at 01:17

## 2022-07-11 RX ADMIN — SODIUM CHLORIDE 75 MILLILITER(S): 9 INJECTION INTRAMUSCULAR; INTRAVENOUS; SUBCUTANEOUS at 17:33

## 2022-07-11 RX ADMIN — Medication 650 MILLIGRAM(S): at 13:00

## 2022-07-11 RX ADMIN — ATORVASTATIN CALCIUM 20 MILLIGRAM(S): 80 TABLET, FILM COATED ORAL at 21:09

## 2022-07-11 RX ADMIN — Medication 650 MILLIGRAM(S): at 11:55

## 2022-07-11 RX ADMIN — SODIUM CHLORIDE 75 MILLILITER(S): 9 INJECTION INTRAMUSCULAR; INTRAVENOUS; SUBCUTANEOUS at 21:55

## 2022-07-11 RX ADMIN — HEPARIN SODIUM 5000 UNIT(S): 5000 INJECTION INTRAVENOUS; SUBCUTANEOUS at 17:17

## 2022-07-11 NOTE — PHYSICAL THERAPY INITIAL EVALUATION ADULT - IMPAIRMENTS FOUND, PT EVAL
Subjective   Deja Gao is a 3 y.o. female.     Earache    There is pain in the right ear. This is a new problem. The current episode started yesterday. The problem has been unchanged. Maximum temperature: tactile. Pain scale: shari with pain when she does not have motrin. Associated symptoms include rhinorrhea (mild). Pertinent negatives include no abdominal pain, coughing, diarrhea, ear discharge, headaches, rash, sore throat or vomiting. Treatments tried: motrin. The treatment provided mild relief. There is no history of a tympanostomy tube.        Current Outpatient Medications on File Prior to Visit   Medication Sig Dispense Refill   • [DISCONTINUED] pediatric multivitamin-iron (POLY-VI-SOL with IRON) solution Take 1 mL by mouth Daily. 30 mL 1     No current facility-administered medications on file prior to visit.        No Known Allergies    Past Medical History:   Diagnosis Date   • Otitis media    • Premature baby     30 weeks        History reviewed. No pertinent surgical history.    Family History   Problem Relation Age of Onset   • Diabetes Maternal Grandmother         Copied from mother's family history at birth   • Hypertension Maternal Grandmother         Copied from mother's family history at birth   • Heart disease Maternal Grandfather         Copied from mother's family history at birth   • Hypertension Maternal Grandfather         Copied from mother's family history at birth   • Hypertension Mother         Copied from mother's history at birth   • Mental illness Mother         Copied from mother's history at birth       Social History     Socioeconomic History   • Marital status: Single     Spouse name: Not on file   • Number of children: Not on file   • Years of education: Not on file   • Highest education level: Not on file   Tobacco Use   • Smoking status: Never Smoker   • Smokeless tobacco: Never Used       Review of Systems   Constitutional: Positive for activity change, appetite change,  crying, fever (tactile) and irritability.   HENT: Positive for ear pain and rhinorrhea (mild). Negative for ear discharge and sore throat.    Respiratory: Negative for cough.    Gastrointestinal: Negative for abdominal pain, diarrhea and vomiting.   Skin: Negative for rash.   Neurological: Negative for headaches.       Pulse 112   Temp 98.5 °F (36.9 °C)   Resp 24   Wt 12.7 kg (28 lb)   SpO2 97%     Objective   Physical Exam   Constitutional: She appears well-developed. She is active and cooperative.   HENT:   Head: Normocephalic.   Right Ear: External ear, pinna and canal normal. Tympanic membrane is erythematous. A middle ear effusion is present.   Left Ear: Tympanic membrane, external ear, pinna and canal normal.   Nose: Rhinorrhea present.   Mouth/Throat: Mucous membranes are moist. Oropharynx is clear.   Eyes: Lids are normal. Visual tracking is normal.   Neck: Neck adenopathy (right) present.   Cardiovascular: Regular rhythm. Tachycardia present.   Pulmonary/Chest: Effort normal. There is normal air entry.   Abdominal: Soft. Bowel sounds are normal. There is no tenderness.   Neurological: She is alert.   Skin: Skin is warm and dry. No rash noted.         Assessment/Plan   Deja was seen today for earache.    Diagnoses and all orders for this visit:    Right acute serous otitis media, recurrence not specified  -     amoxicillin (AMOXIL) 250 MG/5ML suspension; Take 10 mL by mouth 2 (Two) Times a Day for 10 days.          Follow up with PCP or go to the nearest emergency room if symptoms worsen or fail to improve.   aerobic capacity/endurance/gait, locomotion, and balance/muscle strength

## 2022-07-11 NOTE — PHYSICAL THERAPY INITIAL EVALUATION ADULT - NS ASR WT BEARING DETAIL LLE
[place] brace for the left knee to be worn while out of bed, unlocked; WBAT LLE/weight-bearing as tolerated

## 2022-07-11 NOTE — PHYSICAL THERAPY INITIAL EVALUATION ADULT - PLANNED THERAPY INTERVENTIONS, PT EVAL
GOAL: Pt will be able to negotiate 13 steps independently in 2 weeks./balance training/bed mobility training/gait training/strengthening/transfer training
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
## 2022-07-12 RX ORDER — SODIUM CHLORIDE 9 MG/ML
1000 INJECTION INTRAMUSCULAR; INTRAVENOUS; SUBCUTANEOUS
Refills: 0 | Status: DISCONTINUED | OUTPATIENT
Start: 2022-07-12 | End: 2022-07-13

## 2022-07-12 RX ORDER — SODIUM CHLORIDE 9 MG/ML
1000 INJECTION INTRAMUSCULAR; INTRAVENOUS; SUBCUTANEOUS
Refills: 0 | Status: DISCONTINUED | OUTPATIENT
Start: 2022-07-12 | End: 2022-07-12

## 2022-07-12 RX ADMIN — ATORVASTATIN CALCIUM 20 MILLIGRAM(S): 80 TABLET, FILM COATED ORAL at 21:02

## 2022-07-12 RX ADMIN — SODIUM CHLORIDE 75 MILLILITER(S): 9 INJECTION INTRAMUSCULAR; INTRAVENOUS; SUBCUTANEOUS at 18:52

## 2022-07-12 RX ADMIN — Medication 650 MILLIGRAM(S): at 06:44

## 2022-07-12 RX ADMIN — HEPARIN SODIUM 5000 UNIT(S): 5000 INJECTION INTRAVENOUS; SUBCUTANEOUS at 06:43

## 2022-07-12 RX ADMIN — Medication 650 MILLIGRAM(S): at 07:11

## 2022-07-12 RX ADMIN — SODIUM CHLORIDE 75 MILLILITER(S): 9 INJECTION INTRAMUSCULAR; INTRAVENOUS; SUBCUTANEOUS at 21:02

## 2022-07-12 RX ADMIN — Medication 650 MILLIGRAM(S): at 14:30

## 2022-07-12 RX ADMIN — Medication 650 MILLIGRAM(S): at 13:33

## 2022-07-12 RX ADMIN — HEPARIN SODIUM 5000 UNIT(S): 5000 INJECTION INTRAVENOUS; SUBCUTANEOUS at 18:22

## 2022-07-13 ENCOUNTER — TRANSCRIPTION ENCOUNTER (OUTPATIENT)
Age: 77
End: 2022-07-13

## 2022-07-13 VITALS
HEART RATE: 98 BPM | RESPIRATION RATE: 18 BRPM | DIASTOLIC BLOOD PRESSURE: 73 MMHG | OXYGEN SATURATION: 91 % | TEMPERATURE: 97 F | SYSTOLIC BLOOD PRESSURE: 131 MMHG

## 2022-07-13 LAB
ANION GAP SERPL CALC-SCNC: 10 MMOL/L — SIGNIFICANT CHANGE UP (ref 5–17)
BUN SERPL-MCNC: 22 MG/DL — SIGNIFICANT CHANGE UP (ref 7–23)
CALCIUM SERPL-MCNC: 9.8 MG/DL — SIGNIFICANT CHANGE UP (ref 8.4–10.5)
CHLORIDE SERPL-SCNC: 103 MMOL/L — SIGNIFICANT CHANGE UP (ref 96–108)
CO2 SERPL-SCNC: 25 MMOL/L — SIGNIFICANT CHANGE UP (ref 22–31)
CREAT SERPL-MCNC: 0.95 MG/DL — SIGNIFICANT CHANGE UP (ref 0.5–1.3)
EGFR: 62 ML/MIN/1.73M2 — SIGNIFICANT CHANGE UP
GLUCOSE SERPL-MCNC: 98 MG/DL — SIGNIFICANT CHANGE UP (ref 70–99)
POTASSIUM SERPL-MCNC: 4.6 MMOL/L — SIGNIFICANT CHANGE UP (ref 3.5–5.3)
POTASSIUM SERPL-SCNC: 4.6 MMOL/L — SIGNIFICANT CHANGE UP (ref 3.5–5.3)
SODIUM SERPL-SCNC: 138 MMOL/L — SIGNIFICANT CHANGE UP (ref 135–145)

## 2022-07-13 PROCEDURE — 71045 X-RAY EXAM CHEST 1 VIEW: CPT

## 2022-07-13 PROCEDURE — 85027 COMPLETE CBC AUTOMATED: CPT

## 2022-07-13 PROCEDURE — 74177 CT ABD & PELVIS W/CONTRAST: CPT | Mod: MA

## 2022-07-13 PROCEDURE — 85610 PROTHROMBIN TIME: CPT

## 2022-07-13 PROCEDURE — 73110 X-RAY EXAM OF WRIST: CPT

## 2022-07-13 PROCEDURE — 86803 HEPATITIS C AB TEST: CPT

## 2022-07-13 PROCEDURE — 82607 VITAMIN B-12: CPT

## 2022-07-13 PROCEDURE — 70450 CT HEAD/BRAIN W/O DYE: CPT | Mod: MA

## 2022-07-13 PROCEDURE — 81001 URINALYSIS AUTO W/SCOPE: CPT

## 2022-07-13 PROCEDURE — 97162 PT EVAL MOD COMPLEX 30 MIN: CPT

## 2022-07-13 PROCEDURE — 84443 ASSAY THYROID STIM HORMONE: CPT

## 2022-07-13 PROCEDURE — 73562 X-RAY EXAM OF KNEE 3: CPT

## 2022-07-13 PROCEDURE — 80048 BASIC METABOLIC PNL TOTAL CA: CPT

## 2022-07-13 PROCEDURE — 73700 CT LOWER EXTREMITY W/O DYE: CPT

## 2022-07-13 PROCEDURE — 97116 GAIT TRAINING THERAPY: CPT

## 2022-07-13 PROCEDURE — 93306 TTE W/DOPPLER COMPLETE: CPT

## 2022-07-13 PROCEDURE — 70496 CT ANGIOGRAPHY HEAD: CPT | Mod: MA

## 2022-07-13 PROCEDURE — 83735 ASSAY OF MAGNESIUM: CPT

## 2022-07-13 PROCEDURE — 97110 THERAPEUTIC EXERCISES: CPT

## 2022-07-13 PROCEDURE — 82962 GLUCOSE BLOOD TEST: CPT

## 2022-07-13 PROCEDURE — 73090 X-RAY EXAM OF FOREARM: CPT

## 2022-07-13 PROCEDURE — 84100 ASSAY OF PHOSPHORUS: CPT

## 2022-07-13 PROCEDURE — 83690 ASSAY OF LIPASE: CPT

## 2022-07-13 PROCEDURE — 99285 EMERGENCY DEPT VISIT HI MDM: CPT | Mod: 25

## 2022-07-13 PROCEDURE — 85730 THROMBOPLASTIN TIME PARTIAL: CPT

## 2022-07-13 PROCEDURE — 82746 ASSAY OF FOLIC ACID SERUM: CPT

## 2022-07-13 PROCEDURE — 70498 CT ANGIOGRAPHY NECK: CPT | Mod: MA

## 2022-07-13 PROCEDURE — 85025 COMPLETE CBC W/AUTO DIFF WBC: CPT

## 2022-07-13 PROCEDURE — 71260 CT THORAX DX C+: CPT | Mod: MA

## 2022-07-13 PROCEDURE — 80053 COMPREHEN METABOLIC PANEL: CPT

## 2022-07-13 PROCEDURE — 84484 ASSAY OF TROPONIN QUANT: CPT

## 2022-07-13 PROCEDURE — 36415 COLL VENOUS BLD VENIPUNCTURE: CPT

## 2022-07-13 PROCEDURE — 72125 CT NECK SPINE W/O DYE: CPT | Mod: MA

## 2022-07-13 PROCEDURE — 87637 SARSCOV2&INF A&B&RSV AMP PRB: CPT

## 2022-07-13 PROCEDURE — 83605 ASSAY OF LACTIC ACID: CPT

## 2022-07-13 PROCEDURE — 80307 DRUG TEST PRSMV CHEM ANLYZR: CPT

## 2022-07-13 PROCEDURE — 76377 3D RENDER W/INTRP POSTPROCES: CPT

## 2022-07-13 RX ORDER — MIDODRINE HYDROCHLORIDE 2.5 MG/1
5 TABLET ORAL
Refills: 0 | Status: DISCONTINUED | OUTPATIENT
Start: 2022-07-13 | End: 2022-07-13

## 2022-07-13 RX ORDER — ASPIRIN/CALCIUM CARB/MAGNESIUM 324 MG
1 TABLET ORAL
Qty: 0 | Refills: 0 | DISCHARGE

## 2022-07-13 RX ORDER — HYDROXYZINE HCL 10 MG
1 TABLET ORAL
Qty: 0 | Refills: 0 | DISCHARGE

## 2022-07-13 RX ORDER — MILK THISTLE 150 MG
0 CAPSULE ORAL
Qty: 0 | Refills: 0 | DISCHARGE

## 2022-07-13 RX ORDER — CHOLECALCIFEROL (VITAMIN D3) 125 MCG
0 CAPSULE ORAL
Qty: 0 | Refills: 0 | DISCHARGE

## 2022-07-13 RX ORDER — MIDODRINE HYDROCHLORIDE 2.5 MG/1
1 TABLET ORAL
Qty: 0 | Refills: 0 | DISCHARGE
Start: 2022-07-13

## 2022-07-13 RX ORDER — PREGABALIN 225 MG/1
1 CAPSULE ORAL
Qty: 0 | Refills: 0 | DISCHARGE

## 2022-07-13 RX ADMIN — Medication 650 MILLIGRAM(S): at 00:59

## 2022-07-13 RX ADMIN — Medication 650 MILLIGRAM(S): at 01:55

## 2022-07-13 RX ADMIN — HEPARIN SODIUM 5000 UNIT(S): 5000 INJECTION INTRAVENOUS; SUBCUTANEOUS at 05:12

## 2022-07-13 NOTE — PROGRESS NOTE ADULT - PROBLEM SELECTOR PLAN 2
B/L ulnar styloid fracture    - bilateral wrist cock-up splints for comfort, WBAT bilateral upper extremities  CT knee - PCL avulsion injury of the knee - [place] brace for the left knee to be worn while out of bed, unlocked; WBAT LLE  no acute surgical interventions   pain management, ice/elevation  ortho following
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
## 2022-07-13 NOTE — DISCHARGE NOTE NURSING/CASE MANAGEMENT/SOCIAL WORK - PATIENT PORTAL LINK FT
You can access the FollowMyHealth Patient Portal offered by [facility] by registering at the following website: [email or web address]. By joining [facility]’s FollowMyHealth portal, you will also be able to view your health information using other applications (apps) compatible with our system.
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
## 2022-07-13 NOTE — PROGRESS NOTE ADULT - PROVIDER SPECIALTY LIST ADULT
Internal Medicine
Cardiology
Orthopedics
Internal Medicine
Cardiology
Cardiology

## 2022-07-13 NOTE — DISCHARGE NOTE NURSING/CASE MANAGEMENT/SOCIAL WORK - NSDCPEFALRISK_GEN_ALL_CORE
For information on Fall & Injury Prevention, visit: [email or web address] OR  [email or web address] OR  https://www.cdc.gov/steadi/patient.html

## 2022-07-14 RX ORDER — MIDODRINE HYDROCHLORIDE 2.5 MG/1
1 TABLET ORAL
Qty: 60 | Refills: 0
Start: 2022-07-14 | End: 2022-08-12

## 2022-12-06 ENCOUNTER — APPOINTMENT (OUTPATIENT)
Dept: MAMMOGRAPHY | Facility: CLINIC | Age: 77
End: 2022-12-06

## 2022-12-06 ENCOUNTER — APPOINTMENT (OUTPATIENT)
Dept: ULTRASOUND IMAGING | Facility: CLINIC | Age: 77
End: 2022-12-06

## 2022-12-06 PROCEDURE — 77063 BREAST TOMOSYNTHESIS BI: CPT

## 2022-12-06 PROCEDURE — 77067 SCR MAMMO BI INCL CAD: CPT

## 2022-12-06 PROCEDURE — 76641 ULTRASOUND BREAST COMPLETE: CPT | Mod: 50

## 2023-08-30 NOTE — PATIENT PROFILE ADULT - FUNCTIONAL SCREEN CURRENT LEVEL: SWALLOWING (IF SCORE 2 OR MORE FOR ANY ITEM, CONSULT REHAB SERVICES), MLM)
Medicare Wellness  Personal Prevention Plan of Service     Date of Office Visit:    Encounter Provider:  Cindy Zhang MD  Place of Service:  [facility]  Patient Name: Yehuda Capellan  :  1952    As part of the Medicare Wellness portion of your visit today, we are providing you with this personalized preventive plan of services (PPPS). This plan is based upon recommendations of the [place] Preventive Services Task Force (USPSTF) and the Advisory Committee on Immunization Practices (ACIP).    This lists the preventive care services that should be considered, and provides dates of when you are due. Items listed as completed are up-to-date and do not require any further intervention.    Health Maintenance   Topic Date Due    TDAP/TD VACCINES (1 - Tdap) 2019    DIABETIC FOOT EXAM  2021    DIABETIC EYE EXAM  02/10/2022    COLORECTAL CANCER SCREENING  2023    ANNUAL WELLNESS VISIT  08/15/2023    LIPID PANEL  08/15/2023    URINE MICROALBUMIN  08/15/2023    BMI FOLLOWUP  08/15/2023    COVID-19 Vaccine (3 - Pfizer series) 2024 (Originally 2021)    ZOSTER VACCINE (2 of 3) 2029 (Originally 10/24/2014)    INFLUENZA VACCINE  10/01/2023    HEMOGLOBIN A1C  2024    HEPATITIS C SCREENING  Completed    Pneumococcal Vaccine 65+  Completed    AAA SCREEN (ONE-TIME)  Completed       No orders of the defined types were placed in this encounter.      Return in about 3 months (around 12/3/2023) for Diabetes follow up, labs fasting  or after (at least few days prior to appt).        Health Maintenance, Male  A healthy lifestyle and preventive care is important for your health and wellness. Ask your health care provider about what schedule of regular examinations is right for you.  What should I know about weight and diet?    Eat a Healthy Diet  Eat plenty of vegetables, fruits, whole grains, low-fat dairy products, and lean protein.  Do not  eat a lot of foods high in solid fats, added sugars, or salt.     Maintain a Healthy Weight  Regular exercise can help you achieve or maintain a healthy weight. You should:  Do at least 150 minutes of exercise each week. The exercise should increase your heart rate and make you sweat (moderate-intensity exercise).  Do strength-training exercises at least twice a week.     Watch Your Levels of Cholesterol and Blood Lipids  Have your blood tested for lipids and cholesterol every 5 years starting at 35 years of age. If you are at high risk for heart disease, you should start having your blood tested when you are 20 years old. You may need to have your cholesterol levels checked more often if:  Your lipid or cholesterol levels are high.  You are older than 50 years of age.  You are at high risk for heart disease.     What should I know about cancer screening?  Many types of cancers can be detected early and may often be prevented.  Lung Cancer  You should be screened every year for lung cancer if:  You are a current smoker who has smoked for at least 30 years.  You are a former smoker who has quit within the past 15 years.  Talk to your health care provider about your screening options, when you should start screening, and how often you should be screened.     Colorectal Cancer  Routine colorectal cancer screening usually begins at 50 years of age and should be repeated every 5-10 years until you are 75 years old. You may need to be screened more often if early forms of precancerous polyps or small growths are found. Your health care provider may recommend screening at an earlier age if you have risk factors for colon cancer.  Your health care provider may recommend using home test kits to check for hidden blood in the stool.  A small camera at the end of a tube can be used to examine your colon (sigmoidoscopy or colonoscopy). This checks for the earliest forms of colorectal cancer.     Prostate and Testicular  Cancer  Depending on your age and overall health, your health care provider may do certain tests to screen for prostate and testicular cancer.  Talk to your health care provider about any symptoms or concerns you have about testicular or prostate cancer.     Skin Cancer  Check your skin from head to toe regularly.  Tell your health care provider about any new moles or changes in moles, especially if:  There is a change in a mole's size, shape, or color.  You have a mole that is larger than a pencil eraser.  Always use sunscreen. Apply sunscreen liberally and repeat throughout the day.  Protect yourself by wearing long sleeves, pants, a wide-brimmed hat, and sunglasses when outside.     What should I know about heart disease, diabetes, and high blood pressure?  If you are 18-39 years of age, have your blood pressure checked every 3-5 years. If you are 40 years of age or older, have your blood pressure checked every year. You should have your blood pressure measured twice--once when you are at a hospital or clinic, and once when you are not at a hospital or clinic. Record the average of the two measurements. To check your blood pressure when you are not at a hospital or clinic, you can use:  An automated blood pressure machine at a pharmacy.  A home blood pressure monitor.  Talk to your health care provider about your target blood pressure.  If you are between 45-79 years old, ask your health care provider if you should take aspirin to prevent heart disease.  Have regular diabetes screenings by checking your fasting blood sugar level.  If you are at a normal weight and have a low risk for diabetes, have this test once every three years after the age of 45.  If you are overweight and have a high risk for diabetes, consider being tested at a younger age or more often.  A one-time screening for abdominal aortic aneurysm (AAA) by ultrasound is recommended for men aged 65-75 years who are current or former smokers.  What  should I know about preventing infection?  Hepatitis B  If you have a higher risk for hepatitis B, you should be screened for this virus. Talk with your health care provider to find out if you are at risk for hepatitis B infection.  Hepatitis C  Blood testing is recommended for:  Everyone born from 1945 through 1965.  Anyone with known risk factors for hepatitis C.     Sexually Transmitted Diseases (STDs)  You should be screened each year for STDs including gonorrhea and chlamydia if:  You are sexually active and are younger than 24 years of age.  You are older than 24 years of age and your health care provider tells you that you are at risk for this type of infection.  Your sexual activity has changed since you were last screened and you are at an increased risk for chlamydia or gonorrhea. Ask your health care provider if you are at risk.  Talk with your health care provider about whether you are at high risk of being infected with HIV. Your health care provider may recommend a prescription medicine to help prevent HIV infection.     What else can I do?    Schedule regular health, dental, and eye exams.  Stay current with your vaccines (immunizations).  Do not use any tobacco products, such as cigarettes, chewing tobacco, and e-cigarettes. If you need help quitting, ask your health care provider.  Limit alcohol intake to no more than 2 drinks per day. One drink equals 12 ounces of beer, 5 ounces of wine, or 1« ounces of hard liquor.  Do not use street drugs.  Do not share needles.  Ask your health care provider for help if you need support or information about quitting drugs.  Tell your health care provider if you often feel depressed.  Tell your health care provider if you have ever been abused or do not feel safe at home.      This information is not intended to replace advice given to you by your health care provider. Make sure you discuss any questions you have with your health care provider.  Document  Released: 06/15/2009 Document Revised: 08/16/2017 Document Reviewed: 09/20/2016  [employer] Patient Education c 2018 [employer]      Fall Prevention in the Home, Adult  Falls can cause injuries and affect people of all ages. There are many simple things that you can do to make your home safe and to help prevent falls. Ask for help when making these changes, if needed.  What actions can I take to prevent falls?  General instructions  Use good lighting in all rooms. Replace any light bulbs that burn out, turn on lights if it is dark, and use night-lights.  Place frequently used items in easy-to-reach places. Lower the shelves around your home if necessary.  Set up furniture so that there are clear paths around it. Avoid moving your furniture around.  Remove throw rugs and other tripping hazards from the floor.  Avoid walking on wet floors.  Fix any uneven floor surfaces.  Add color or contrast paint or tape to grab bars and handrails in your home. Place contrasting color strips on the first and last steps of staircases.  When you use a stepladder, make sure that it is completely opened and that the sides and supports are firmly locked. Have someone hold the ladder while you are using it. Do not climb a closed stepladder.  Know where your pets are when moving through your home.  What can I do in the bathroom?         Keep the floor dry. Immediately clean up any water that is on the floor.  Remove soap buildup in the tub or shower regularly.  Use nonskid mats or decals on the floor of the tub or shower.  Attach bath mats securely with double-sided, nonslip rug tape.  If you need to sit down while you are in the shower, use a plastic, nonslip stool.  Install grab bars by the toilet and in the tub and shower. Do not use towel bars as grab bars.  What can I do in the bedroom?  Make sure that a bedside light is easy to reach.  Do not use oversized bedding that reaches the floor.  Have a firm chair that has side  arms to use for getting dressed.  What can I do in the kitchen?  Clean up any spills right away.  If you need to reach for something above you, use a sturdy step stool that has a grab bar.  Keep electrical cables out of the way.  Do not use floor polish or wax that makes floors slippery. If you must use wax, make sure that it is non-skid floor wax.  What can I do with my stairs?  Do not leave any items on the stairs.  Make sure that you have a light switch at the top and the bottom of the stairs. Have them installed if you do not have them.  Make sure that there are handrails on both sides of the stairs. Fix handrails that are broken or loose. Make sure that handrails are as long as the staircases.  Install non-slip stair treads on all stairs in your home.  Avoid having throw rugs at the top or bottom of stairs, or secure the rugs with carpet tape to prevent them from moving.  Choose a carpet design that does not hide the edge of steps on the stairs.  Check any carpeting to make sure that it is firmly attached to the stairs. Fix any carpet that is loose or worn.  What can I do on the outside of my home?  Use bright outdoor lighting.  Regularly repair the edges of walkways and driveways and fix any cracks.  Remove high doorway thresholds.  Trim any shrubbery on the main path into your home.  Regularly check that handrails are securely fastened and in good repair. Both sides of all steps should have handrails.  Install guardrails along the edges of any raised decks or porches.  Clear walkways of debris and clutter, including tools and rocks.  Have leaves, snow, and ice cleared regularly.  Use sand or salt on walkways during winter months.  In the garage, clean up any spills right away, including grease or oil spills.  What other actions can I take?  Wear closed-toe shoes that fit well and support your feet. Wear shoes that have rubber soles or low heels.  Use mobility aids as needed, such as canes, walkers, scooters,  and crutches.  Review your medicines with your health care provider. Some medicines can cause dizziness or changes in blood pressure, which increase your risk of falling.  Talk with your health care provider about other ways that you can decrease your risk of falls. This may include working with a physical therapist or  to improve your strength, balance, and endurance.  Where to find more information  Centers for Disease Control and Prevention, [place]: www.cdc.gov  [employer] [place] on Aging: www.[place].nih.gov  Contact a health care provider if:  You are afraid of falling at home.  You feel weak, drowsy, or dizzy at home.  You fall at home.  Summary  There are many simple things that you can do to make your home safe and to help prevent falls.  Ways to make your home safe include removing tripping hazards and installing grab bars in the bathroom.  Ask for help when making these changes in your home.  This information is not intended to replace advice given to you by your health care provider. Make sure you discuss any questions you have with your health care provider.  Document Revised: 09/19/2022 Document Reviewed: 07/21/2021  [employer] Patient Education c 2023 [employer]    Sit-to-Stand Exercise    The sit-to-stand exercise (also known as the chair stand or chair rise exercise) strengthens your lower body and helps you maintain or improve your mobility and independence. The end goal is to do the sit-to-stand exercise without using your hands. This will be easier as you become stronger. You should always talk with your health care provider before starting any exercise program, especially if you have had recent surgery.  Do the exercise exactly as told by your health care provider and adjust it as directed. It is normal to feel mild stretching, pulling, tightness, or discomfort as you do this exercise, but you should stop right away if you feel sudden pain or your pain gets worse. Do not begin doing this  exercise until told by your health care provider.  What the sit-to-stand exercise does  The sit-to-stand exercise helps to strengthen the muscles in your thighs and the muscles in the center of your body that give you stability (core muscles). This exercise is especially helpful if:  You have had knee or hip surgery.  You have trouble getting up from a chair, out of a car, or off the toilet due to muscle weakness.  How to do the sit-to-stand exercise  Sit toward the front edge of a sturdy chair without armrests. Your knees should be bent and your feet should be flat on the floor and shoulder-width apart and underneath your hips.  Place your hands lightly on each side of the seat. Keep your back and neck as straight as possible, with your chest slightly forward.  Breathe in slowly. Lean forward and slightly shift your weight to the front of your feet.  Breathe out as you slowly stand up. Try not to support any weight with your hands.  Stand and pause for a full breath in and out.  Breathe in as you sit down slowly. Tighten your core and abdominal muscles to control your lowering as much as possible. You should lower yourself back to the chair slowly, not just drop back into the seat.  Breathe out slowly.  Do this exercise 10-15 times. If needed, do it fewer times until you build up strength.  Rest for 1 minute, then do another set of 10-15 repetitions.  To change the difficulty of the sit-to-stand exercise  If the exercise is too difficult, use a chair with sturdy armrests, and push off the armrests to help you come to the standing position. You can also use the armrests to help slowly lower yourself back to sitting. As this gets easier, try to use your arms less. You can also place a firm cushion or pillow on the chair to make the surface higher.  If this exercise is too easy, do not use your arms to help raise or lower yourself. You can also wear a weighted vest, use hand weights, increase your repetitions, or try  a lower chair.  General tips  You may feel tired when starting an exercise routine. This is normal.  You may have muscle soreness that lasts a few days. This is normal. As you get stronger, you may not feel muscle soreness.  Use smooth, steady movements.  Do not  hold your breath during strength exercises. This can cause unsafe changes in your blood pressure.  Breathe in slowly through your nose, and breathe out slowly through your mouth.  Summary  Strengthening your lower body is an important step to help you move safely and independently.  The sit-to-stand exercise helps strengthen the muscles in your thighs and core.  You should always talk with your health care provider before starting any exercise program, especially if you have had recent surgery.  This information is not intended to replace advice given to you by your health care provider. Make sure you discuss any questions you have with your health care provider.  Document Revised: 04/10/2022 Document Reviewed: 04/10/2022  [employer] Patient Education c 2023 [employer]    Exercising to Stay Healthy  To become healthy and stay healthy, it is recommended that you do moderate-intensity and vigorous-intensity exercise. You can tell that you are exercising at a moderate intensity if your heart starts beating faster and you start breathing faster but can still hold a conversation. You can tell that you are exercising at a vigorous intensity if you are breathing much harder and faster and cannot hold a conversation while exercising.  How can exercise benefit me?  Exercising regularly is important. It has many health benefits, such as:  Improving overall fitness, flexibility, and endurance.  Increasing bone density.  Helping with weight control.  Decreasing body fat.  Increasing muscle strength and endurance.  Reducing stress and tension, anxiety, depression, or anger.  Improving overall health.  What guidelines should I follow while exercising?  Before you start  a new exercise program, talk with your health care provider.  Do not exercise so much that you hurt yourself, feel dizzy, or get very short of breath.  Wear comfortable clothes and wear shoes with good support.  Drink plenty of water while you exercise to prevent dehydration or heat stroke.  Work out until your breathing and your heartbeat get faster (moderate intensity).  How often should I exercise?  Choose an activity that you enjoy, and set realistic goals. Your health care provider can help you make an activity plan that is individually designed and works best for you.  Exercise regularly as told by your health care provider. This may include:  Doing strength training two times a week, such as:  Lifting weights.  Using resistance bands.  Push-ups.  Sit-ups.  Yoga.  Doing a certain intensity of exercise for a given amount of time. Choose from these options:  A total of 150 minutes of moderate-intensity exercise every week.  A total of 75 minutes of vigorous-intensity exercise every week.  A mix of moderate-intensity and vigorous-intensity exercise every week.  Children, pregnant women, people who have not exercised regularly, people who are overweight, and older adults may need to talk with a health care provider about what activities are safe to perform. If you have a medical condition, be sure to talk with your health care provider before you start a new exercise program.  What are some exercise ideas?  Moderate-intensity exercise ideas include:  Walking 1 mile (1.6 km) in about 15 minutes.  Biking.  Hiking.  Golfing.  Dancing.  Water aerobics.  Vigorous-intensity exercise ideas include:  Walking 4.5 miles (7.2 km) or more in about 1 hour.  Jogging or running 5 miles (8 km) in about 1 hour.  Biking 10 miles (16.1 km) or more in about 1 hour.  Lap swimming.  Roller-skating or in-line skating.  Cross-country skiing.  Vigorous competitive sports, such as football, basketball, and soccer.  Jumping rope.  Aerobic  dancing.  What are some everyday activities that can help me get exercise?  Yard work, such as:  Pushing a .  Raking and bagging leaves.  Washing your car.  Pushing a stroller.  Shoveling snow.  Gardening.  Washing windows or floors.  How can I be more active in my day-to-day activities?  Use stairs instead of an elevator.  Take a walk during your lunch break.  If you drive, park your car farther away from your work or school.  If you take public transportation, get off one stop early and walk the rest of the way.  Stand up or walk around during all of your indoor phone calls.  Get up, stretch, and walk around every 30 minutes throughout the day.  Enjoy exercise with a friend. Support to continue exercising will help you keep a regular routine of activity.  Where to find more information  You can find more information about exercising to stay healthy from:  U.S. Department of Health and Human Services: www.hhs.gov  Centers for Disease Control and Prevention (CDC): www.cdc.gov  Summary  Exercising regularly is important. It will improve your overall fitness, flexibility, and endurance.  Regular exercise will also improve your overall health. It can help you control your weight, reduce stress, and improve your bone density.  Do not exercise so much that you hurt yourself, feel dizzy, or get very short of breath.  Before you start a new exercise program, talk with your health care provider.  This information is not intended to replace advice given to you by your health care provider. Make sure you discuss any questions you have with your health care provider.  Document Revised: 04/15/2022 Document Reviewed: 04/15/2022  [employer] Patient Education c 2023 [employer]     0 = swallows foods/liquids without difficulty

## 2023-12-07 ENCOUNTER — APPOINTMENT (OUTPATIENT)
Dept: MAMMOGRAPHY | Facility: CLINIC | Age: 78
End: 2023-12-07
Payer: MEDICARE

## 2023-12-07 ENCOUNTER — APPOINTMENT (OUTPATIENT)
Dept: ULTRASOUND IMAGING | Facility: CLINIC | Age: 78
End: 2023-12-07
Payer: MEDICARE

## 2023-12-07 PROCEDURE — 77063 BREAST TOMOSYNTHESIS BI: CPT

## 2023-12-07 PROCEDURE — 76641 ULTRASOUND BREAST COMPLETE: CPT | Mod: 50

## 2023-12-07 PROCEDURE — 77067 SCR MAMMO BI INCL CAD: CPT

## 2024-04-13 ENCOUNTER — EMERGENCY (EMERGENCY)
Facility: HOSPITAL | Age: 79
LOS: 1 days | Discharge: HOME | End: 2024-04-13
Attending: EMERGENCY MEDICINE
Payer: MEDICARE

## 2024-04-13 VITALS
OXYGEN SATURATION: 96 % | RESPIRATION RATE: 22 BRPM | TEMPERATURE: 98 F | WEIGHT: 149.91 LBS | SYSTOLIC BLOOD PRESSURE: 184 MMHG | HEART RATE: 107 BPM | DIASTOLIC BLOOD PRESSURE: 77 MMHG | HEIGHT: 68 IN

## 2024-04-13 LAB
ALBUMIN SERPL ELPH-MCNC: 4.4 G/DL — SIGNIFICANT CHANGE UP (ref 3.3–5)
ALP SERPL-CCNC: 102 U/L — SIGNIFICANT CHANGE UP (ref 40–120)
ALT FLD-CCNC: 21 U/L — SIGNIFICANT CHANGE UP (ref 10–45)
ANION GAP SERPL CALC-SCNC: 12 MMOL/L — SIGNIFICANT CHANGE UP (ref 5–17)
AST SERPL-CCNC: 20 U/L — SIGNIFICANT CHANGE UP (ref 10–40)
BASOPHILS # BLD AUTO: 0.05 K/UL — SIGNIFICANT CHANGE UP (ref 0–0.2)
BASOPHILS NFR BLD AUTO: 0.5 % — SIGNIFICANT CHANGE UP (ref 0–2)
BILIRUB SERPL-MCNC: 0.3 MG/DL — SIGNIFICANT CHANGE UP (ref 0.2–1.2)
BUN SERPL-MCNC: 25 MG/DL — HIGH (ref 7–23)
CALCIUM SERPL-MCNC: 10.9 MG/DL — HIGH (ref 8.4–10.5)
CHLORIDE SERPL-SCNC: 103 MMOL/L — SIGNIFICANT CHANGE UP (ref 96–108)
CO2 SERPL-SCNC: 23 MMOL/L — SIGNIFICANT CHANGE UP (ref 22–31)
CREAT SERPL-MCNC: 1.18 MG/DL — SIGNIFICANT CHANGE UP (ref 0.5–1.3)
EGFR: 48 ML/MIN/1.73M2 — LOW
EOSINOPHIL # BLD AUTO: 0.06 K/UL — SIGNIFICANT CHANGE UP (ref 0–0.5)
EOSINOPHIL NFR BLD AUTO: 0.6 % — SIGNIFICANT CHANGE UP (ref 0–6)
GLUCOSE SERPL-MCNC: 120 MG/DL — HIGH (ref 70–99)
HCT VFR BLD CALC: 41.5 % — SIGNIFICANT CHANGE UP (ref 34.5–45)
HGB BLD-MCNC: 13.9 G/DL — SIGNIFICANT CHANGE UP (ref 11.5–15.5)
IMM GRANULOCYTES NFR BLD AUTO: 0.3 % — SIGNIFICANT CHANGE UP (ref 0–0.9)
LIDOCAIN IGE QN: 21 U/L — SIGNIFICANT CHANGE UP (ref 7–60)
LYMPHOCYTES # BLD AUTO: 1.52 K/UL — SIGNIFICANT CHANGE UP (ref 1–3.3)
LYMPHOCYTES # BLD AUTO: 14 % — SIGNIFICANT CHANGE UP (ref 13–44)
MCHC RBC-ENTMCNC: 28.8 PG — SIGNIFICANT CHANGE UP (ref 27–34)
MCHC RBC-ENTMCNC: 33.5 GM/DL — SIGNIFICANT CHANGE UP (ref 32–36)
MCV RBC AUTO: 86.1 FL — SIGNIFICANT CHANGE UP (ref 80–100)
MONOCYTES # BLD AUTO: 0.83 K/UL — SIGNIFICANT CHANGE UP (ref 0–0.9)
MONOCYTES NFR BLD AUTO: 7.7 % — SIGNIFICANT CHANGE UP (ref 2–14)
NEUTROPHILS # BLD AUTO: 8.34 K/UL — HIGH (ref 1.8–7.4)
NEUTROPHILS NFR BLD AUTO: 76.9 % — SIGNIFICANT CHANGE UP (ref 43–77)
NRBC # BLD: 0 /100 WBCS — SIGNIFICANT CHANGE UP (ref 0–0)
PLATELET # BLD AUTO: 212 K/UL — SIGNIFICANT CHANGE UP (ref 150–400)
POTASSIUM SERPL-MCNC: 4.1 MMOL/L — SIGNIFICANT CHANGE UP (ref 3.5–5.3)
POTASSIUM SERPL-SCNC: 4.1 MMOL/L — SIGNIFICANT CHANGE UP (ref 3.5–5.3)
PROT SERPL-MCNC: 7.2 G/DL — SIGNIFICANT CHANGE UP (ref 6–8.3)
RBC # BLD: 4.82 M/UL — SIGNIFICANT CHANGE UP (ref 3.8–5.2)
RBC # FLD: 13.2 % — SIGNIFICANT CHANGE UP (ref 10.3–14.5)
SODIUM SERPL-SCNC: 138 MMOL/L — SIGNIFICANT CHANGE UP (ref 135–145)
WBC # BLD: 10.83 K/UL — HIGH (ref 3.8–10.5)
WBC # FLD AUTO: 10.83 K/UL — HIGH (ref 3.8–10.5)

## 2024-04-13 PROCEDURE — 99285 EMERGENCY DEPT VISIT HI MDM: CPT

## 2024-04-13 RX ORDER — ONDANSETRON 8 MG/1
4 TABLET, FILM COATED ORAL ONCE
Refills: 0 | Status: COMPLETED | OUTPATIENT
Start: 2024-04-13 | End: 2024-04-13

## 2024-04-13 RX ORDER — SODIUM CHLORIDE 9 MG/ML
1000 INJECTION INTRAMUSCULAR; INTRAVENOUS; SUBCUTANEOUS ONCE
Refills: 0 | Status: COMPLETED | OUTPATIENT
Start: 2024-04-13 | End: 2024-04-13

## 2024-04-13 RX ORDER — KETOROLAC TROMETHAMINE 30 MG/ML
15 SYRINGE (ML) INJECTION ONCE
Refills: 0 | Status: DISCONTINUED | OUTPATIENT
Start: 2024-04-13 | End: 2024-04-13

## 2024-04-13 RX ORDER — ACETAMINOPHEN 500 MG
1000 TABLET ORAL ONCE
Refills: 0 | Status: COMPLETED | OUTPATIENT
Start: 2024-04-13 | End: 2024-04-13

## 2024-04-13 RX ADMIN — ONDANSETRON 4 MILLIGRAM(S): 8 TABLET, FILM COATED ORAL at 22:51

## 2024-04-13 RX ADMIN — Medication 15 MILLIGRAM(S): at 22:50

## 2024-04-13 RX ADMIN — Medication 400 MILLIGRAM(S): at 22:50

## 2024-04-13 RX ADMIN — SODIUM CHLORIDE 1000 MILLILITER(S): 9 INJECTION INTRAMUSCULAR; INTRAVENOUS; SUBCUTANEOUS at 22:50

## 2024-04-13 NOTE — ED ADULT NURSE NOTE - OBJECTIVE STATEMENT
78y female w/ pmh of kidney stones presents with left sided flank pain and abdominal pain. Pt states she began having pain on Thursday night. Pt states the pain has persisted and worsened since then and arrives to the ED in severe pain. Pt is endorsing nausea with dry heaving but has not vomited. Pt states she saw her urologist in february who informed her she has two small stones one in each kidney and is being monitored with her next appointment in may. Pt denies fever, chills, urinary symptoms.

## 2024-04-13 NOTE — ED PROVIDER NOTE - CLINICAL SUMMARY MEDICAL DECISION MAKING FREE TEXT BOX
78-year-old female with known kidney stone, presented for sudden onset left-sided flank pain with radiation down to left groin.  High suspicion for kidney stone less likely pyelonephritis less likely renal cell carcinoma less likely other kidney pathology.  Will do baseline lab  to rule out JOHN, CT scan for stone hunt.  Pain control.  If pain is controlled,  will DC home.

## 2024-04-13 NOTE — ED ADULT NURSE NOTE - NSFALLUNIVINTERV_ED_ALL_ED
Bed/Stretcher in lowest position, wheels locked, appropriate side rails in place/Call bell, personal items and telephone in reach/Instruct patient to call for assistance before getting out of bed/chair/stretcher/Non-slip footwear applied when patient is off stretcher/[place] to call system/Physically safe environment - no spills, clutter or unnecessary equipment/Purposeful proactive rounding/Room/bathroom lighting operational, light cord in reach

## 2024-04-13 NOTE — ED PROVIDER NOTE - NS ED ROS FT
CONSTITUTIONAL: No fever or chill  HEENT: Denies changes in vision and hearing.  RESPIRATORY: Denies SOB and cough.  CV: Deneis CP.   GI: LLQ and left flank pain, nausea  : Denies dysuria and urinary frequency.  MSK: Denies myalgia and joint pain.  SKIN: Denies rash   NEUROLOGICAL: Denies headache and syncope.

## 2024-04-13 NOTE — ED PROVIDER NOTE - PHYSICAL EXAMINATION
GENERAL: Alert. mild distress due to pain  EYES: EOMI grossly normal. Anicteric.   HENT: Moist mucous membranes.   RESP: No conversation dyspnea, no resp distress, CTAB   CARDIOVASCULAR: RRR, no m/g/r  ABDOMEN: soft, non distended, nttp  MSK: ROM grossly normal in all 4 extremities. No deformities  SKIN: warm and dry  NEUROLOGIC: Alert and oriented x3  PSYCHIATRIC: Cooperative. Appropriate mood and affect

## 2024-04-13 NOTE — ED PROVIDER NOTE - OBJECTIVE STATEMENT
78y Female with PMHx of HLD, alopecia, occasional low BP, known bilateral small kidney stone, here for left flank pain with radiation down to LLQ starting two days ago. +nausea. no vomiting. No diarrhea, blood in stool, or other abdominal pain.

## 2024-04-13 NOTE — ED PROVIDER NOTE - NSFOLLOWUPINSTRUCTIONS_ED_ALL_ED_FT
You can use 500-1000mg Tylenol every 6 hours for pain - as needed; maximal daily dose 3000mg.      You can use 400 mg Ibuprofen (such as motrin or advil) every 6 to 8 hours as needed for pain control.  Take ibuprofen with food or milk to lessen stomach upset.      strain your urine    follow-up with your urologist    English    Kidney Stones  A body outline of the urinary tract with a close-up of a kidney showing kidney stones.  Kidney stones are solid, rock-like deposits that form inside of the kidneys. The kidneys are a pair of organs that make urine. A kidney stone may form in a kidney and move into other parts of the urinary tract, including the tubes that connect the kidneys to the bladder (ureters), the bladder, and the tube that carries urine out of the body (urethra). As the stone moves through these areas, it can cause intense pain and block the flow of urine.    Kidney stones are created when high levels of certain minerals are found in the urine. The stones are usually passed out of the body through urination, but in some cases, medical treatment may be needed to remove them.    What are the causes?  Kidney stones may be caused by:  A condition in which certain glands produce too much parathyroid hormone (primary hyperparathyroidism), which causes too much calcium buildup in the blood.  A buildup of uric acid crystals in the bladder (hyperuricosuria). Uric acid is a chemical that the body produces when you eat certain foods. It usually leaves the body in the urine.  Narrowing (stricture) of one or both of the ureters.  A kidney blockage that is present at birth (congenital obstruction).  Past surgery on the kidney or the ureters.  What increases the risk?  The following factors may make you more likely to develop this condition:  Having had a kidney stone in the past.  Having a family history of kidney stones.  Not drinking enough water.  Eating a diet that is high in protein, salt (sodium), or sugar.  Being overweight or obese.  What are the signs or symptoms?  Symptoms of a kidney stone may include:  Pain in the side of the abdomen, right below the ribs (flank pain). Pain usually spreads (radiates) to the groin.  Needing to urinate often or urgently.  Painful urination.  Blood in the urine (hematuria).  Nausea.  Vomiting.  Fever and chills.  How is this diagnosed?  This condition may be diagnosed based on:  Your symptoms and medical history.  A physical exam.  Blood tests.  Urine tests. These may be done before and after the stone passes out of your body through urination.  Imaging tests, such as a CT scan, abdominal X-ray, or ultrasound.  A procedure to examine the inside of the bladder (cystoscopy).  How is this treated?  Treatment for kidney stones depends on the size, location, and makeup of the stones. Kidney stones will often pass out of the body through urination. You may need to:  Increase your fluid intake to help pass the stone. In some cases, you may be given fluids through an IV and may need to be monitored in the hospital.  Take medicine for pain.  Make changes in your diet to help prevent kidney stones from coming back.  Sometimes, procedures are needed to remove a kidney stone. This may involve:  A procedure to break up kidney stones using:  A focused beam of light (laser therapy).  Shock waves (extracorporeal shock wave lithotripsy).  Surgery to remove kidney stones. This may be needed if you have severe pain or have stones that block your urinary tract.  Follow these instructions at home:  Medicines    Take over-the-counter and prescription medicines only as told by your health care provider.  Ask your health care provider if the medicine prescribed to you requires you to avoid driving or using heavy machinery.  Eating and drinking    Drink enough fluid to keep your urine pale yellow. You may be instructed to drink at least 8–10 glasses of water each day. This will help you pass the kidney stone.  If directed, change your diet. This may include:  Limiting how much sodium you eat.  Eating more fruits and vegetables.  Limiting how much animal protein you eat. Animal proteins include red meat, poultry, fish, and eggs.  Eating a normal amount of calcium (1,000–1,300 mg per day).  Follow instructions from your health care provider about eating or drinking restrictions.  General instructions    Collect urine samples as told by your health care provider. You may need to collect a urine sample:  24 hours after you pass the stone.  8–12 weeks after you pass the kidney stone, and every 6–12 months after that.  Strain your urine every time you urinate, for as long as directed. Use the strainer that your health care provider recommends.  Do not throw out the kidney stone after passing it. Keep the stone so it can be tested by your health care provider. Testing the makeup of your kidney stone may help prevent you from getting kidney stones in the future.  Keep all follow-up visits. You may need follow-up X-rays or ultrasounds to make sure that your stone has passed.  How is this prevented?  A comparison of three sample cups showing dark yellow, yellow, and pale yellow urine.  To prevent another kidney stone:  Drink enough fluid to keep your urine pale yellow. This is the best way to prevent kidney stones.  Eat a healthy diet. Follow recommendations from your health care provider about foods to avoid. Recommendations vary depending on the type of kidney stone that you have. You may be instructed to eat a low-protein diet.  Maintain a healthy weight.  Where to find more information  National Kidney Foundation (NKF): www.kidney.org  [employer] (UCF): www.urologyhealth.org  Contact a health care provider if:  You have pain that gets worse or does not get better with medicine.  Get help right away if:  You have a fever or chills.  You develop severe pain.  You develop new abdominal pain.  You faint.  You are unable to urinate.  Summary  Kidney stones are solid, rock-like deposits that form inside of the kidneys.  Kidney stones can cause nausea, vomiting, blood in the urine, abdominal pain, and the urge to urinate often.  Treatment for kidney stones depends on the size, location, and makeup of the stones. Kidney stones will often pass out of the body through urination.  Kidney stones can be prevented by drinking enough fluids, eating a healthy diet, and maintaining a healthy weight.  This information is not intended to replace advice given to you by your health care provider. Make sure you discuss any questions you have with your health care provider.    Document Revised: 03/29/2023 Document Reviewed: 03/29/2023  [employer] Patient Education © 2024 [employer]  [employer] logo  Terms and Conditions  Privacy Policy  Editorial Policy  All content on this site: Copyright © 2024 Else[employer], its licensors, and contributors. All rights are reserved, including those for text and data mining, AI training, and similar technologies. For all open access content, the Creative Commons licensing terms apply.  Cookies are used by this site. To decline or learn more, visit our Cookies page.  [employer]

## 2024-04-13 NOTE — ED PROVIDER NOTE - PATIENT PORTAL LINK FT
You can access the FollowMyHealth Patient Portal offered by [facility] by registering at the following website: [email or web address]. By joining [employer]’s FollowMyHealth portal, you will also be able to view your health information using other applications (apps) compatible with our system.

## 2024-04-13 NOTE — ED PROVIDER NOTE - ATTENDING CONTRIBUTION TO CARE
attending Madelin: 78yF former smoker (many pack years quit 5 months ago), h/o HLD, alopecia, occasional low BP, known bilateral intrarenal kidney stones, p/w L flank pain with radiation to LLQ x 2 days. +nausea. Denies vomiting, fever. Exam as above. Likely stone. Will obtain labs, UA/Ucx, CT imaging, pain control, reassess

## 2024-04-13 NOTE — ED PROVIDER NOTE - PROGRESS NOTE DETAILS
Adán Gregory) [facility] PGY-2: reeval pt. Much more comfortable after acetaminophen and toradol

## 2024-04-14 VITALS
DIASTOLIC BLOOD PRESSURE: 92 MMHG | TEMPERATURE: 98 F | RESPIRATION RATE: 18 BRPM | SYSTOLIC BLOOD PRESSURE: 140 MMHG | HEART RATE: 86 BPM | OXYGEN SATURATION: 98 %

## 2024-04-14 LAB
APPEARANCE UR: CLEAR — SIGNIFICANT CHANGE UP
BACTERIA # UR AUTO: NEGATIVE /HPF — SIGNIFICANT CHANGE UP
BILIRUB UR-MCNC: NEGATIVE — SIGNIFICANT CHANGE UP
CAST: 0 /LPF — SIGNIFICANT CHANGE UP (ref 0–4)
COLOR SPEC: YELLOW — SIGNIFICANT CHANGE UP
DIFF PNL FLD: NEGATIVE — SIGNIFICANT CHANGE UP
GLUCOSE UR QL: NEGATIVE MG/DL — SIGNIFICANT CHANGE UP
KETONES UR-MCNC: ABNORMAL MG/DL
LEUKOCYTE ESTERASE UR-ACNC: ABNORMAL
NITRITE UR-MCNC: NEGATIVE — SIGNIFICANT CHANGE UP
PH UR: 6 — SIGNIFICANT CHANGE UP (ref 5–8)
PROT UR-MCNC: NEGATIVE MG/DL — SIGNIFICANT CHANGE UP
RBC CASTS # UR COMP ASSIST: 1 /HPF — SIGNIFICANT CHANGE UP (ref 0–4)
SP GR SPEC: 1.01 — SIGNIFICANT CHANGE UP (ref 1–1.03)
SQUAMOUS # UR AUTO: 2 /HPF — SIGNIFICANT CHANGE UP (ref 0–5)
UROBILINOGEN FLD QL: 0.2 MG/DL — SIGNIFICANT CHANGE UP (ref 0.2–1)
WBC UR QL: 8 /HPF — HIGH (ref 0–5)

## 2024-04-14 PROCEDURE — 85025 COMPLETE CBC W/AUTO DIFF WBC: CPT

## 2024-04-14 PROCEDURE — 81001 URINALYSIS AUTO W/SCOPE: CPT

## 2024-04-14 PROCEDURE — 87086 URINE CULTURE/COLONY COUNT: CPT

## 2024-04-14 PROCEDURE — 99284 EMERGENCY DEPT VISIT MOD MDM: CPT | Mod: 25

## 2024-04-14 PROCEDURE — 80053 COMPREHEN METABOLIC PANEL: CPT

## 2024-04-14 PROCEDURE — 96375 TX/PRO/DX INJ NEW DRUG ADDON: CPT

## 2024-04-14 PROCEDURE — 36415 COLL VENOUS BLD VENIPUNCTURE: CPT

## 2024-04-14 PROCEDURE — 83690 ASSAY OF LIPASE: CPT

## 2024-04-14 PROCEDURE — 74177 CT ABD & PELVIS W/CONTRAST: CPT | Mod: MC

## 2024-04-14 PROCEDURE — 74177 CT ABD & PELVIS W/CONTRAST: CPT | Mod: 26,MC

## 2024-04-14 PROCEDURE — 96374 THER/PROPH/DIAG INJ IV PUSH: CPT | Mod: XU

## 2024-04-14 RX ORDER — OXYCODONE HYDROCHLORIDE 5 MG/1
1 TABLET ORAL
Refills: 0
Start: 2024-04-14

## 2024-04-15 LAB
CULTURE RESULTS: SIGNIFICANT CHANGE UP
SPECIMEN SOURCE: SIGNIFICANT CHANGE UP

## 2024-11-11 ENCOUNTER — EMERGENCY (EMERGENCY)
Facility: HOSPITAL | Age: 79
LOS: 1 days | Discharge: HOME | End: 2024-11-11
Attending: EMERGENCY MEDICINE
Payer: MEDICARE

## 2024-11-11 VITALS
HEART RATE: 100 BPM | OXYGEN SATURATION: 94 % | HEIGHT: 68 IN | WEIGHT: 160.06 LBS | TEMPERATURE: 98 F | SYSTOLIC BLOOD PRESSURE: 158 MMHG | RESPIRATION RATE: 20 BRPM | DIASTOLIC BLOOD PRESSURE: 94 MMHG

## 2024-11-11 VITALS
SYSTOLIC BLOOD PRESSURE: 149 MMHG | RESPIRATION RATE: 16 BRPM | HEART RATE: 89 BPM | DIASTOLIC BLOOD PRESSURE: 81 MMHG | TEMPERATURE: 98 F | OXYGEN SATURATION: 99 %

## 2024-11-11 LAB
A1C WITH ESTIMATED AVERAGE GLUCOSE RESULT: 5.8 % — HIGH (ref 4–5.6)
BASOPHILS # BLD AUTO: 0.04 K/UL — SIGNIFICANT CHANGE UP (ref 0–0.2)
BASOPHILS NFR BLD AUTO: 0.7 % — SIGNIFICANT CHANGE UP (ref 0–2)
EOSINOPHIL # BLD AUTO: 0.06 K/UL — SIGNIFICANT CHANGE UP (ref 0–0.5)
EOSINOPHIL NFR BLD AUTO: 1 % — SIGNIFICANT CHANGE UP (ref 0–6)
ESTIMATED AVERAGE GLUCOSE: 120 MG/DL — HIGH (ref 68–114)
HCT VFR BLD CALC: 40.1 % — SIGNIFICANT CHANGE UP (ref 34.5–45)
HGB BLD-MCNC: 13.2 G/DL — SIGNIFICANT CHANGE UP (ref 11.5–15.5)
IMM GRANULOCYTES NFR BLD AUTO: 0.3 % — SIGNIFICANT CHANGE UP (ref 0–0.9)
LYMPHOCYTES # BLD AUTO: 1.56 K/UL — SIGNIFICANT CHANGE UP (ref 1–3.3)
LYMPHOCYTES # BLD AUTO: 25.5 % — SIGNIFICANT CHANGE UP (ref 13–44)
MCHC RBC-ENTMCNC: 28.3 PG — SIGNIFICANT CHANGE UP (ref 27–34)
MCHC RBC-ENTMCNC: 32.9 G/DL — SIGNIFICANT CHANGE UP (ref 32–36)
MCV RBC AUTO: 86.1 FL — SIGNIFICANT CHANGE UP (ref 80–100)
MONOCYTES # BLD AUTO: 0.61 K/UL — SIGNIFICANT CHANGE UP (ref 0–0.9)
MONOCYTES NFR BLD AUTO: 10 % — SIGNIFICANT CHANGE UP (ref 2–14)
NEUTROPHILS # BLD AUTO: 3.83 K/UL — SIGNIFICANT CHANGE UP (ref 1.8–7.4)
NEUTROPHILS NFR BLD AUTO: 62.5 % — SIGNIFICANT CHANGE UP (ref 43–77)
NRBC # BLD: 0 /100 WBCS — SIGNIFICANT CHANGE UP (ref 0–0)
NT-PROBNP SERPL-SCNC: 119 PG/ML — SIGNIFICANT CHANGE UP (ref 0–300)
PLATELET # BLD AUTO: 200 K/UL — SIGNIFICANT CHANGE UP (ref 150–400)
RBC # BLD: 4.66 M/UL — SIGNIFICANT CHANGE UP (ref 3.8–5.2)
RBC # FLD: 13.3 % — SIGNIFICANT CHANGE UP (ref 10.3–14.5)
T3 SERPL-MCNC: 142 NG/DL — SIGNIFICANT CHANGE UP (ref 80–200)
T4 AB SER-ACNC: 9.3 UG/DL — SIGNIFICANT CHANGE UP (ref 4.6–12)
TROPONIN T, HIGH SENSITIVITY RESULT: 15 NG/L — SIGNIFICANT CHANGE UP (ref 0–51)
TROPONIN T, HIGH SENSITIVITY RESULT: 18 NG/L — SIGNIFICANT CHANGE UP (ref 0–51)
TSH SERPL-MCNC: 2.7 UIU/ML — SIGNIFICANT CHANGE UP (ref 0.27–4.2)
WBC # BLD: 6.12 K/UL — SIGNIFICANT CHANGE UP (ref 3.8–10.5)
WBC # FLD AUTO: 6.12 K/UL — SIGNIFICANT CHANGE UP (ref 3.8–10.5)

## 2024-11-11 PROCEDURE — 36000 PLACE NEEDLE IN VEIN: CPT | Mod: XU

## 2024-11-11 PROCEDURE — 84480 ASSAY TRIIODOTHYRONINE (T3): CPT

## 2024-11-11 PROCEDURE — 80053 COMPREHEN METABOLIC PANEL: CPT

## 2024-11-11 PROCEDURE — 74177 CT ABD & PELVIS W/CONTRAST: CPT | Mod: MC

## 2024-11-11 PROCEDURE — 84436 ASSAY OF TOTAL THYROXINE: CPT

## 2024-11-11 PROCEDURE — 74177 CT ABD & PELVIS W/CONTRAST: CPT | Mod: 26,MC

## 2024-11-11 PROCEDURE — 99285 EMERGENCY DEPT VISIT HI MDM: CPT | Mod: 25

## 2024-11-11 PROCEDURE — 84484 ASSAY OF TROPONIN QUANT: CPT

## 2024-11-11 PROCEDURE — 93005 ELECTROCARDIOGRAM TRACING: CPT

## 2024-11-11 PROCEDURE — 99285 EMERGENCY DEPT VISIT HI MDM: CPT

## 2024-11-11 PROCEDURE — 83880 ASSAY OF NATRIURETIC PEPTIDE: CPT

## 2024-11-11 PROCEDURE — 83036 HEMOGLOBIN GLYCOSYLATED A1C: CPT

## 2024-11-11 PROCEDURE — 85025 COMPLETE CBC W/AUTO DIFF WBC: CPT

## 2024-11-11 PROCEDURE — 84443 ASSAY THYROID STIM HORMONE: CPT

## 2024-11-11 NOTE — ED PROVIDER NOTE - OBJECTIVE STATEMENT
80 y/o F w/ PMH of HLD, alopecia(on doxcycline), hypotension(on Midodrine) p/w 20 poung weight gain x 1 month. Pt states she has swelling of her lower abdomen, b/l breasts, and b/l ankle swelling for one month, noting 20lb weight gain. Denies fever, SOB, CP, abdominal pain, n/v/d. Pt still experiences post-menopausal chills. Pt has normal bowel movement, last BM this morning. Denies hematuria, dysuria, urinary urgency/frequency

## 2024-11-11 NOTE — ED PROVIDER NOTE - CLINICAL SUMMARY MEDICAL DECISION MAKING FREE TEXT BOX
80 y/o F w/ PMH of HLD, alopecia(on doxcycline), hypotension(on Midodrine) p/w 20 poung weight gain x 1 month.   Differential diagnosis includes but not limited to hypothyroidism, CHF, cancer.  Will evaluate with TSH, pro-BNP, CTAP, CBC, CMP. Will dispo based on labs and imaging, likely dc with PCP f/u.

## 2024-11-11 NOTE — ED PROVIDER NOTE - PATIENT PORTAL LINK FT
You can access the FollowMyHealth Patient Portal offered by [facility] by registering at the following website: [email or web address]. By joining [employer]’s FollowMyHealth portal, you will also be able to view your health information using other applications (apps) compatible with our system.

## 2024-11-11 NOTE — ED PROVIDER NOTE - PHYSICAL EXAMINATION
The patient's mucous membranes appeared normal and patient's trachea is midline. No tachycardia was observed, and the patient's breathing was normal. Examining the belly revealed it to be soft with no tenderness; however, it appeared bloated. Examination of the lower body revealed no visible swelling in the legs or thighs. No cardiovascular concerns. Examination did not appear pathologic.

## 2024-11-11 NOTE — ED PROVIDER NOTE - ATTENDING CONTRIBUTION TO CARE
Chief Complaint:    - Weight gain, lower abdominal swelling, bilateral ankle swelling, and breast swelling   HPI:    - The patient comes in reporting a 20-pound weight gain over the last month. She has a history of Hyperlipidemia (HLD) and alopecia and is currently on doxycycline and minodrine for Hypertension. The patient complains of lower abdominal swelling, bilateral ankle swelling and breast swelling. She denied having fevers, chills and shortness of breath. The patient describes postmenopausal chills, but otherwise has normal bowel movements. There is no objective evidence of swelling or weight gain on examination.   Past Medical History:    -  Hyperlipidemia (HLD)    -  Alopecia    -  Hypertension   Medications:    -  Doxycycline    -  Minodrine   Review of Systems:    -  Negative for fevers, chills, or shortness of breath    -  Reports postmenopausal chills, but otherwise has normal bowel movements   Physical Examination:    -  No edema present on examination    -  Patient's lungs are clear without any crackles    -  No objective signs of swelling or weight gain    -  No palpable abdominal mass present      Labs and Studies:    -  Thyroid labs for possible hypothyroidism    -  URINE HCG    -  Serum Pro-BNP    -  Troponin    -  Electrolyte panel    -  Metabolic panel     Medical Decision Making:    -  In this case, the most concerning symptom was the patient's report of weight gain and swelling despite no objective signs visible on physical examination. This discrepancy led me to first consider conditions where fluid retention and weight gain could occur such as heart disease, kidney disease, or thyroid issues. Consequently, I ordered labs for thyroid function, Pro-BNP, troponin, and metabolic panel. Also, given her no findings of increased fluid or soft tissue on the physical examination, considerations were also made for potential electrolyte or metabolic disorders. Furthermore, the patient will need to monitor and provide feedback concerning her symptoms to her primary care physician or cardiologist.     Impression:    -  Possible edematous condition    -  Possible hormonal dysfunction    -  Weight gain nos    -  Malignancy not likely based on history and clinical exam but patient educated to follow up closely with pmd for further management Chief Complaint:    - Weight gain, lower abdominal swelling, bilateral ankle swelling, and breast swelling   HPI:    - The patient comes in reporting a 20-pound weight gain over the last month. She has a history of Hyperlipidemia (HLD) and alopecia and is currently on doxycycline and midodrine for Hypertension. The patient complains of lower abdominal swelling, bilateral ankle swelling and breast swelling. She denied having fevers, chills and shortness of breath. The patient describes postmenopausal chills, but otherwise has normal bowel movements. There is no objective evidence of swelling or weight gain on examination.   Past Medical History:    -  Hyperlipidemia (HLD)    -  Alopecia    -  Hypertension   Medications:    -  Doxycycline    -  Midodrine   Review of Systems:    -  Negative for fevers, chills, or shortness of breath    -  Reports postmenopausal chills, but otherwise has normal bowel movements   Physical Examination:    -  No edema present on examination    -  Patient's lungs are clear without any crackles    -  No objective signs of swelling or weight gain    -  No palpable abdominal mass present      Labs and Studies:    -  Thyroid labs for possible hypothyroidism    -  URINE HCG    -  Serum Pro-BNP    -  Troponin    -  Electrolyte panel    -  Metabolic panel     Medical Decision Making:    -  In this case, the most concerning symptom was the patient's report of weight gain and swelling despite no objective signs visible on physical examination. This discrepancy led me to first consider conditions where fluid retention and weight gain could occur such as heart disease, kidney disease, or thyroid issues. Consequently, I ordered labs for thyroid function, Pro-BNP, troponin, and metabolic panel. Also, given her no findings of increased fluid or soft tissue on the physical examination, considerations were also made for potential electrolyte or metabolic disorders. Furthermore, the patient will need to monitor and provide feedback concerning her symptoms to her primary care physician or cardiologist.     Impression:    -  Possible edematous condition    -  Possible hormonal dysfunction    -  Weight gain nos    -  Malignancy not likely based on history and clinical exam but patient educated to follow up closely with pmd for further management    Portions of this note have been documented using voice recognition software. As a result, errors may occur in the transcription process. Effort has been made to correct all grammatical and transcription error, but some may have been missed which may produce sporadic inaccurate transcription or nonsensical phrases.

## 2024-11-11 NOTE — ED ADULT NURSE NOTE - OBJECTIVE STATEMENT
78 y/o female with PMH of HLD, arrives to the ER complaining of edema. Pt reports having bilateral leg swelling, weight gain in abd and chest since Oct 2023. Pt reports 20 pounds of weight gain since october.  Pt denies SOB, chest pain, dizziness, N/V/D, urinary symptoms, fevers, chills.  On assessment pt is well appearing, A&Ox4, speaking coherently, airway is patent, breathing spontaneously and unlabored. Skin is dry, warm. Abdomen is firm, distended, no tender. Full ROM in all extremities.  Patient undressed and placed into gown, side rails up with bed locked and in lowest position for safety. call bell within reach. [place] provided. Comfort and safety provided. will continue to reassess.

## 2024-11-11 NOTE — ED PROVIDER NOTE - NSFOLLOWUPINSTRUCTIONS_ED_ALL_ED_FT
It was a pleasure caring for you today!    You were seen in the ER today for recent weight changes. Your CT scan showed that you the lining of your uterus called the endometrium is thickened, measuring 0.7 cm. Please follow up with your gynecologist for a biopsy for further evaluation.     Return to the ER for any worsening symptoms or concerns, including chest pain, shortness of breath, lightheadedness, weakness, or any other concerns.

## 2024-11-19 ENCOUNTER — APPOINTMENT (OUTPATIENT)
Dept: ULTRASOUND IMAGING | Facility: CLINIC | Age: 79
End: 2024-11-19
Payer: MEDICARE

## 2024-11-19 PROCEDURE — 76830 TRANSVAGINAL US NON-OB: CPT

## 2024-11-19 PROCEDURE — 76856 US EXAM PELVIC COMPLETE: CPT

## 2024-12-09 ENCOUNTER — APPOINTMENT (OUTPATIENT)
Dept: MAMMOGRAPHY | Facility: CLINIC | Age: 79
End: 2024-12-09
Payer: MEDICARE

## 2024-12-09 ENCOUNTER — APPOINTMENT (OUTPATIENT)
Dept: ULTRASOUND IMAGING | Facility: CLINIC | Age: 79
End: 2024-12-09
Payer: MEDICARE

## 2024-12-09 PROCEDURE — 76641 ULTRASOUND BREAST COMPLETE: CPT | Mod: 50

## 2024-12-09 PROCEDURE — 77067 SCR MAMMO BI INCL CAD: CPT

## 2024-12-09 PROCEDURE — 77063 BREAST TOMOSYNTHESIS BI: CPT
